# Patient Record
Sex: MALE
[De-identification: names, ages, dates, MRNs, and addresses within clinical notes are randomized per-mention and may not be internally consistent; named-entity substitution may affect disease eponyms.]

---

## 2018-11-15 ENCOUNTER — HOSPITAL ENCOUNTER (INPATIENT)
Dept: HOSPITAL 42 - ED | Age: 33
LOS: 3 days | Discharge: HOME | DRG: 603 | End: 2018-11-18
Attending: FAMILY MEDICINE | Admitting: FAMILY MEDICINE
Payer: SELF-PAY

## 2018-11-15 VITALS — BODY MASS INDEX: 43.5 KG/M2

## 2018-11-15 DIAGNOSIS — R65.10: ICD-10-CM

## 2018-11-15 DIAGNOSIS — R51: ICD-10-CM

## 2018-11-15 DIAGNOSIS — L03.115: Primary | ICD-10-CM

## 2018-11-15 DIAGNOSIS — R21: ICD-10-CM

## 2018-11-15 DIAGNOSIS — R94.31: ICD-10-CM

## 2018-11-15 DIAGNOSIS — R07.1: ICD-10-CM

## 2018-11-15 DIAGNOSIS — L53.9: ICD-10-CM

## 2018-11-15 DIAGNOSIS — J45.909: ICD-10-CM

## 2018-11-15 DIAGNOSIS — E66.01: ICD-10-CM

## 2018-11-15 LAB
ALBUMIN SERPL-MCNC: 4.3 G/DL (ref 3–4.8)
ALBUMIN/GLOB SERPL: 1.2 {RATIO} (ref 1.1–1.8)
ALT SERPL-CCNC: 32 U/L (ref 7–56)
APPEARANCE UR: (no result)
ARTERIAL BLOOD GAS HEMOGLOBIN: 14.9 G/DL (ref 11.7–17.4)
ARTERIAL BLOOD GAS O2 SAT: 99.4 % (ref 95–98)
ARTERIAL BLOOD GAS PCO2: 31 MM/HG (ref 35–45)
ARTERIAL BLOOD GAS TCO2: 26.3 MMOL.L (ref 22–28)
AST SERPL-CCNC: 24 U/L (ref 17–59)
BASOPHILS # BLD AUTO: 0 K/MM3 (ref 0–2)
BASOPHILS NFR BLD: 0 % (ref 0–3)
BILIRUB UR-MCNC: NEGATIVE MG/DL
BUN SERPL-MCNC: 12 MG/DL (ref 7–21)
CALCIUM SERPL-MCNC: 9.4 MG/DL (ref 8.4–10.5)
COLOR UR: YELLOW
EOSINOPHIL # BLD: 0 10*3/UL (ref 0–0.7)
EOSINOPHIL NFR BLD: 0 % (ref 1.5–5)
ERYTHROCYTE [DISTWIDTH] IN BLOOD BY AUTOMATED COUNT: 13 % (ref 11.5–14.5)
GFR NON-AFRICAN AMERICAN: > 60
GLUCOSE UR STRIP-MCNC: NEGATIVE MG/DL
GRANULOCYTES # BLD: 15.28 10*3/UL (ref 1.4–6.5)
GRANULOCYTES NFR BLD: 93.2 % (ref 50–68)
HCO3 BLDA-SCNC: 25.3 MMOL/L (ref 21–28)
HGB BLD-MCNC: 14.8 G/DL (ref 14–18)
INHALED O2 CONCENTRATION: 21 %
LEUKOCYTE ESTERASE UR-ACNC: NEGATIVE LEU/UL
LYMPHOCYTE: 1 % (ref 22–35)
LYMPHOCYTES # BLD: 0.6 10*3/UL (ref 1.2–3.4)
LYMPHOCYTES NFR BLD AUTO: 3.4 % (ref 22–35)
MCH RBC QN AUTO: 32.2 PG (ref 25–35)
MCHC RBC AUTO-ENTMCNC: 34.6 G/DL (ref 31–37)
MCV RBC AUTO: 93.2 FL (ref 80–105)
MONOCYTE: 4 % (ref 1–6)
MONOCYTES # BLD AUTO: 0.6 10*3/UL (ref 0.1–0.6)
MONOCYTES NFR BLD: 3.4 % (ref 1–6)
NEUTROPHILS NFR BLD AUTO: 94 % (ref 50–70)
NEUTS BAND NFR BLD: 1 % (ref 0–2)
O2 CAP BLDA-SCNC: 20.3 ML/DL (ref 16–24)
O2 CT BLDA-SCNC: 20.2 ML/DL (ref 15–23)
PH BLDA: 7.52 [PH] (ref 7.35–7.45)
PH UR STRIP: 7.5 [PH] (ref 4.7–8)
PLATELET # BLD EST: NORMAL 10*3/UL
PLATELET # BLD: 252 10^3/UL (ref 120–450)
PMV BLD AUTO: 10.3 FL (ref 7–11)
PO2 BLDA: 82 MM/HG (ref 80–100)
PROT UR STRIP-MCNC: (no result) MG/DL
RBC # BLD AUTO: 4.59 10^6/UL (ref 3.5–6.1)
RBC # UR STRIP: (no result) /UL
RBC #/AREA URNS HPF: (no result) /HPF (ref 0–2)
SP GR UR STRIP: 1.02 (ref 1–1.03)
TROPONIN I SERPL-MCNC: 0.04 NG/ML
UROBILINOGEN UR STRIP-ACNC: 0.2 E.U./DL
WBC # BLD AUTO: 16.4 10^3/UL (ref 4.5–11)
WBC #/AREA URNS HPF: NEGATIVE /HPF (ref 0–6)

## 2018-11-15 RX ADMIN — IPRATROPIUM BROMIDE AND ALBUTEROL SULFATE SCH ML: .5; 3 SOLUTION RESPIRATORY (INHALATION) at 22:44

## 2018-11-15 NOTE — CP.PCM.HP
<Jeyson Guerra - Last Filed: 11/15/18 23:29>





History of Present Illness





- History of Present Illness


History of Present Illness: 





Jeyson Guerra PGY1 History and Physical for Dr Raymond





Pt is a 34 yo male, with a PMH of asthma, who presents to the ED complaining of 

SOB, bilateral upper extremity 5th digit numbness and sweating, and bilateral 

lower extremity cramping since waking up at 10am this morning. Pt reports this 

does not feel like his normal asthma attacks and he does not know if he has ever

had a panic attack in the past. Reports anorexia, as well as nausea but denies 

vomiting. Pt states he tried his albuterol inhaler, but his symptoms were not 

relieved. These symptoms have been present all day. He reports subjective fever,

sweating, heart racing, and mild chest pain on deep breathing, he describes this

pain as a tightness. Denies cough or expectoration. Admits to feeling anxious 

but denies ever having panic attacks or being clinically diagnosed with any 

psychiatric history. Patient denies any recent travel or sick contact. Patient 

denies any drug use. A 12 point ROS was obtained and added to the HPI where 

appropriate. 





PMH: asthma, never intubated, uses inhaler 1-5xper day, 1-2 times per night


PSH: denies


SH: Tobacco never, Alcohol 3-4 drinks on the weekends, admits to marijuana in 

the past 20 years ago


FH: Mother- asthma. Father 60 leukemia. 


Allergies: denies


Home meds: albuterol 


PMD: denies








Present on Admission





- Present on Admission


Any Indicators Present on Admission: No





Review of Systems





- Review of Systems


Review of Systems: 





a 12 point ROS was obtained and added to the HPI where appropriate 





Past Patient History





- Past Social History


Smoking Status: Never Smoked





- CARDIAC


Hx Cardiac Disorders: No





- PULMONARY


Hx Asthma: Yes





- NEUROLOGICAL


Hx Neurological Disorder: No





- HEENT


Hx HEENT Problems: No





- RENAL


Hx Chronic Kidney Disease: No





- ENDOCRINE/METABOLIC


Hx Endocrine Disorders: No





- HEMATOLOGICAL/ONCOLOGICAL


Hx Blood Disorders: No





- INTEGUMENTARY


Hx Dermatological Problems: No





- MUSCULOSKELETAL/RHEUMATOLOGICAL


Hx Musculoskeletal Disorders: No





- GASTROINTESTINAL


Hx Gastrointestinal Disorders: No





- GENITOURINARY/GYNECOLOGICAL


Hx Genitourinary Disorders: No





- PSYCHIATRIC


Hx Psychophysiologic Disorder: No


Hx Substance Use: No





- SURGICAL HISTORY


Hx Surgeries: No





Meds


Allergies/Adverse Reactions: 


                                    Allergies











Allergy/AdvReac Type Severity Reaction Status Date / Time


 


No Known Allergies Allergy   Verified 11/15/18 16:52














Physical Exam





- Constitutional


Appears: Non-toxic, No Acute Distress





- Head Exam


Head Exam: ATRAUMATIC, NORMAL INSPECTION, NORMOCEPHALIC





- Eye Exam


Eye Exam: EOMI





- ENT Exam


ENT Exam: Mucous Membranes Moist





- Neck Exam


Neck exam: Positive for: Full Rom, Normal Inspection





- Respiratory Exam


Respiratory Exam: NORMAL BREATHING PATTERN.  absent: Accessory Muscle Use, 

Wheezes, Respiratory Distress





- Cardiovascular Exam


Cardiovascular Exam: RRR, +S1, +S2.  absent: Diastolic murmur, Systolic Murmur





- GI/Abdominal Exam


GI & Abdominal Exam: Normal Bowel Sounds, Soft.  absent: Tenderness





- Extremities Exam


Extremities exam: Positive for: full ROM, normal inspection, pedal pulses 

present.  Negative for: calf tenderness, pedal edema





- Neurological Exam


Neurological exam: Alert, Oriented x3





- Psychiatric Exam


Psychiatric exam: Normal Affect, Normal Mood





- Skin


Skin Exam: Dry, Intact, Warm





Results





- Vital Signs


Recent Vital Signs: 





                                Last Vital Signs











Temp  98.6 F   11/15/18 17:06


 


Pulse  116 H  11/15/18 17:06


 


Resp  17   11/15/18 17:06


 


BP  144/89   11/15/18 17:06


 


Pulse Ox  98   11/15/18 17:06














- Labs


Result Diagrams: 


                                 11/15/18 17:52





                                 11/15/18 18:51


Labs: 





                         Laboratory Results - last 24 hr











  11/15/18 11/15/18 11/15/18





  17:00 17:52 18:13


 


WBC   16.4 H 


 


RBC   4.59 


 


Hgb   14.8 


 


Hct   42.8 


 


MCV   93.2 


 


MCH   32.2 


 


MCHC   34.6 


 


RDW   13.0 


 


Plt Count   252 


 


MPV   10.3 


 


Gran %   93.2 H 


 


Lymph % (Auto)   3.4 L 


 


Mono % (Auto)   3.4 


 


Eos % (Auto)   0.0 L 


 


Baso % (Auto)   0.0 


 


Gran #   15.28 H 


 


Lymph # (Auto)   0.6 L 


 


Mono # (Auto)   0.6 


 


Eos # (Auto)   0.0 


 


Baso # (Auto)   0.00 


 


Neutrophils % (Manual)   94 H 


 


Band Neutrophils %   1 


 


Lymphocytes % (Manual)   1 L 


 


Monocytes % (Manual)   4 


 


Platelet Evaluation   Normal 


 


D-Dimer, Quantitative   


 


pCO2  31 L  


 


pO2  82.0  


 


HCO3  25.3  


 


ABG pH  7.52 H  


 


ABG Total CO2  26.3  


 


ABG O2 Saturation  99.4 H  


 


ABG O2 Content  20.2  


 


ABG Base Excess  3.2 H  


 


ABG Hemoglobin  14.9  


 


ABG Carboxyhemoglobin  2.2 H  


 


POC ABG HHb (Measured)  0.6  


 


ABG Methemoglobin  1.1  


 


ABG O2 Capacity  20.3  


 


Hgb O2 Saturation  96.1  


 


FiO2  21.0  


 


Sodium   


 


Potassium   


 


Chloride   


 


Carbon Dioxide   


 


Anion Gap   


 


BUN   


 


Creatinine   


 


Est GFR ( Amer)   


 


Est GFR (Non-Af Amer)   


 


Random Glucose   


 


Calcium   


 


Phosphorus   


 


Magnesium   


 


Total Bilirubin   


 


AST   


 


ALT   


 


Alkaline Phosphatase   


 


Total Creatine Kinase   


 


Troponin I   


 


Total Protein   


 


Albumin   


 


Globulin   


 


Albumin/Globulin Ratio   


 


TSH 3rd Generation   


 


Urine Color    Yellow


 


Urine Appearance    Sl cloudy


 


Urine pH    7.5


 


Ur Specific Gravity    1.020


 


Urine Protein    Trace H


 


Urine Glucose (UA)    Negative


 


Urine Ketones    Negative


 


Urine Blood    Trace-intact H


 


Urine Nitrate    Negative


 


Urine Bilirubin    Negative


 


Urine Urobilinogen    0.2


 


Ur Leukocyte Esterase    Negative


 


Urine RBC    0 - 2


 


Urine WBC    Negative


 


Urine Opiates Screen   


 


Urine Methadone Screen   


 


Ur Barbiturates Screen   


 


Ur Phencyclidine Scrn   


 


Ur Amphetamines Screen   


 


U Benzodiazepines Scrn   


 


U Oth Cocaine Metabols   


 


U Cannabinoids Screen   














  11/15/18 11/15/18 11/15/18





  18:13 18:51 19:15


 


WBC   


 


RBC   


 


Hgb   


 


Hct   


 


MCV   


 


MCH   


 


MCHC   


 


RDW   


 


Plt Count   


 


MPV   


 


Gran %   


 


Lymph % (Auto)   


 


Mono % (Auto)   


 


Eos % (Auto)   


 


Baso % (Auto)   


 


Gran #   


 


Lymph # (Auto)   


 


Mono # (Auto)   


 


Eos # (Auto)   


 


Baso # (Auto)   


 


Neutrophils % (Manual)   


 


Band Neutrophils %   


 


Lymphocytes % (Manual)   


 


Monocytes % (Manual)   


 


Platelet Evaluation   


 


D-Dimer, Quantitative   


 


pCO2   


 


pO2   


 


HCO3   


 


ABG pH   


 


ABG Total CO2   


 


ABG O2 Saturation   


 


ABG O2 Content   


 


ABG Base Excess   


 


ABG Hemoglobin   


 


ABG Carboxyhemoglobin   


 


POC ABG HHb (Measured)   


 


ABG Methemoglobin   


 


ABG O2 Capacity   


 


Hgb O2 Saturation   


 


FiO2   


 


Sodium   137 


 


Potassium   4.3 


 


Chloride   97 L 


 


Carbon Dioxide   31 


 


Anion Gap   13 


 


BUN   12 


 


Creatinine   1.3 


 


Est GFR ( Amer)   > 60 


 


Est GFR (Non-Af Amer)   > 60 


 


Random Glucose   116 H 


 


Calcium   9.4 


 


Phosphorus   3.5 


 


Magnesium   1.5 L 


 


Total Bilirubin   1.1 


 


AST   24 


 


ALT   32 


 


Alkaline Phosphatase   64 


 


Total Creatine Kinase   63 


 


Troponin I   0.04 


 


Total Protein   8.0 


 


Albumin   4.3 


 


Globulin   3.7 


 


Albumin/Globulin Ratio   1.2 


 


TSH 3rd Generation    0.51


 


Urine Color   


 


Urine Appearance   


 


Urine pH   


 


Ur Specific Gravity   


 


Urine Protein   


 


Urine Glucose (UA)   


 


Urine Ketones   


 


Urine Blood   


 


Urine Nitrate   


 


Urine Bilirubin   


 


Urine Urobilinogen   


 


Ur Leukocyte Esterase   


 


Urine RBC   


 


Urine WBC   


 


Urine Opiates Screen  Negative  


 


Urine Methadone Screen  Negative  


 


Ur Barbiturates Screen  Negative  


 


Ur Phencyclidine Scrn  Negative  


 


Ur Amphetamines Screen  Negative  


 


U Benzodiazepines Scrn  Negative  


 


U Oth Cocaine Metabols  Negative  


 


U Cannabinoids Screen  Negative  














  11/15/18





  19:29


 


WBC 


 


RBC 


 


Hgb 


 


Hct 


 


MCV 


 


MCH 


 


MCHC 


 


RDW 


 


Plt Count 


 


MPV 


 


Gran % 


 


Lymph % (Auto) 


 


Mono % (Auto) 


 


Eos % (Auto) 


 


Baso % (Auto) 


 


Gran # 


 


Lymph # (Auto) 


 


Mono # (Auto) 


 


Eos # (Auto) 


 


Baso # (Auto) 


 


Neutrophils % (Manual) 


 


Band Neutrophils % 


 


Lymphocytes % (Manual) 


 


Monocytes % (Manual) 


 


Platelet Evaluation 


 


D-Dimer, Quantitative  < 200


 


pCO2 


 


pO2 


 


HCO3 


 


ABG pH 


 


ABG Total CO2 


 


ABG O2 Saturation 


 


ABG O2 Content 


 


ABG Base Excess 


 


ABG Hemoglobin 


 


ABG Carboxyhemoglobin 


 


POC ABG HHb (Measured) 


 


ABG Methemoglobin 


 


ABG O2 Capacity 


 


Hgb O2 Saturation 


 


FiO2 


 


Sodium 


 


Potassium 


 


Chloride 


 


Carbon Dioxide 


 


Anion Gap 


 


BUN 


 


Creatinine 


 


Est GFR ( Amer) 


 


Est GFR (Non-Af Amer) 


 


Random Glucose 


 


Calcium 


 


Phosphorus 


 


Magnesium 


 


Total Bilirubin 


 


AST 


 


ALT 


 


Alkaline Phosphatase 


 


Total Creatine Kinase 


 


Troponin I 


 


Total Protein 


 


Albumin 


 


Globulin 


 


Albumin/Globulin Ratio 


 


TSH 3rd Generation 


 


Urine Color 


 


Urine Appearance 


 


Urine pH 


 


Ur Specific Gravity 


 


Urine Protein 


 


Urine Glucose (UA) 


 


Urine Ketones 


 


Urine Blood 


 


Urine Nitrate 


 


Urine Bilirubin 


 


Urine Urobilinogen 


 


Ur Leukocyte Esterase 


 


Urine RBC 


 


Urine WBC 


 


Urine Opiates Screen 


 


Urine Methadone Screen 


 


Ur Barbiturates Screen 


 


Ur Phencyclidine Scrn 


 


Ur Amphetamines Screen 


 


U Benzodiazepines Scrn 


 


U Oth Cocaine Metabols 


 


U Cannabinoids Screen 














Assessment & Plan





- Assessment and Plan (Free Text)


Assessment: 





Pt is a 34 yo male, with a PMH of asthma, who presents to the ED complaining of 

SOB, bilateral upper extremity 5th digit numbness and sweating. Numbness could 

be secondary to hyperventilation which is inferred from ABGs. Bilateral lower 

extremity cramping which could be a symptom of low mg. Pt reports this SOB does 

not feel like his normal asthma attacks and he does not know if he has ever had 

a panic attack in the past. Pt has chest pain (unspecified), asthma, low mg, and

leukocytosis. 


Plan: 





Chest pain, mild SOB


- ACS rule out


- likely due to anxiety or asthma


- HA1C


- Lipid panel in AM 


- Trend troponin


- ASA given in ED


- O2 2L


- Trend EKG


- ECHO


- cardio consulted, Dr Coe





Leukocytosis


- WBC 16.4


- VBG shock panel 


- blood cultures


- urine cultures


- rectal temp


- procal


- rocephin 





HypoMg


- replete PRN





Asthma


- duonebs PRN/ALEJANDRA





Ppx


- heparin


- protonix








Pt seen, examined, assessment and plan discussed with Dr Segundo Guerra PGY1, Internal Medicine Resident 





- Date & Time


Date: 11/15/18


Time: 21:57





<Monique Raymond - Last Filed: 11/15/18 23:58>





Results





- Vital Signs


Recent Vital Signs: 





                                Last Vital Signs











Temp  98.6 F   11/15/18 17:06


 


Pulse  101 H  11/15/18 23:19


 


Resp  18   11/15/18 23:19


 


BP  137/84   11/15/18 23:19


 


Pulse Ox  100   11/15/18 23:19














- Labs


Result Diagrams: 


                                 11/15/18 17:52





                                 11/15/18 18:51


Labs: 





                         Laboratory Results - last 24 hr











  11/15/18 11/15/18 11/15/18





  17:00 17:52 18:13


 


WBC   16.4 H 


 


RBC   4.59 


 


Hgb   14.8 


 


Hct   42.8 


 


MCV   93.2 


 


MCH   32.2 


 


MCHC   34.6 


 


RDW   13.0 


 


Plt Count   252 


 


MPV   10.3 


 


Gran %   93.2 H 


 


Lymph % (Auto)   3.4 L 


 


Mono % (Auto)   3.4 


 


Eos % (Auto)   0.0 L 


 


Baso % (Auto)   0.0 


 


Gran #   15.28 H 


 


Lymph # (Auto)   0.6 L 


 


Mono # (Auto)   0.6 


 


Eos # (Auto)   0.0 


 


Baso # (Auto)   0.00 


 


Neutrophils % (Manual)   94 H 


 


Band Neutrophils %   1 


 


Lymphocytes % (Manual)   1 L 


 


Monocytes % (Manual)   4 


 


Platelet Evaluation   Normal 


 


D-Dimer, Quantitative   


 


pCO2  31 L  


 


pO2  82.0  


 


HCO3  25.3  


 


ABG pH  7.52 H  


 


ABG Total CO2  26.3  


 


ABG O2 Saturation  99.4 H  


 


ABG O2 Content  20.2  


 


ABG Base Excess  3.2 H  


 


ABG Hemoglobin  14.9  


 


ABG Carboxyhemoglobin  2.2 H  


 


POC ABG HHb (Measured)  0.6  


 


ABG Methemoglobin  1.1  


 


ABG O2 Capacity  20.3  


 


Hgb O2 Saturation  96.1  


 


FiO2  21.0  


 


Sodium   


 


Potassium   


 


Chloride   


 


Carbon Dioxide   


 


Anion Gap   


 


BUN   


 


Creatinine   


 


Est GFR ( Amer)   


 


Est GFR (Non-Af Amer)   


 


Random Glucose   


 


Calcium   


 


Phosphorus   


 


Magnesium   


 


Total Bilirubin   


 


AST   


 


ALT   


 


Alkaline Phosphatase   


 


Total Creatine Kinase   


 


Troponin I   


 


Total Protein   


 


Albumin   


 


Globulin   


 


Albumin/Globulin Ratio   


 


TSH 3rd Generation   


 


Urine Color    Yellow


 


Urine Appearance    Sl cloudy


 


Urine pH    7.5


 


Ur Specific Gravity    1.020


 


Urine Protein    Trace H


 


Urine Glucose (UA)    Negative


 


Urine Ketones    Negative


 


Urine Blood    Trace-intact H


 


Urine Nitrate    Negative


 


Urine Bilirubin    Negative


 


Urine Urobilinogen    0.2


 


Ur Leukocyte Esterase    Negative


 


Urine RBC    0 - 2


 


Urine WBC    Negative


 


Urine Opiates Screen   


 


Urine Methadone Screen   


 


Ur Barbiturates Screen   


 


Ur Phencyclidine Scrn   


 


Ur Amphetamines Screen   


 


U Benzodiazepines Scrn   


 


U Oth Cocaine Metabols   


 


U Cannabinoids Screen   














  11/15/18 11/15/18 11/15/18





  18:13 18:51 19:15


 


WBC   


 


RBC   


 


Hgb   


 


Hct   


 


MCV   


 


MCH   


 


MCHC   


 


RDW   


 


Plt Count   


 


MPV   


 


Gran %   


 


Lymph % (Auto)   


 


Mono % (Auto)   


 


Eos % (Auto)   


 


Baso % (Auto)   


 


Gran #   


 


Lymph # (Auto)   


 


Mono # (Auto)   


 


Eos # (Auto)   


 


Baso # (Auto)   


 


Neutrophils % (Manual)   


 


Band Neutrophils %   


 


Lymphocytes % (Manual)   


 


Monocytes % (Manual)   


 


Platelet Evaluation   


 


D-Dimer, Quantitative   


 


pCO2   


 


pO2   


 


HCO3   


 


ABG pH   


 


ABG Total CO2   


 


ABG O2 Saturation   


 


ABG O2 Content   


 


ABG Base Excess   


 


ABG Hemoglobin   


 


ABG Carboxyhemoglobin   


 


POC ABG HHb (Measured)   


 


ABG Methemoglobin   


 


ABG O2 Capacity   


 


Hgb O2 Saturation   


 


FiO2   


 


Sodium   137 


 


Potassium   4.3 


 


Chloride   97 L 


 


Carbon Dioxide   31 


 


Anion Gap   13 


 


BUN   12 


 


Creatinine   1.3 


 


Est GFR ( Amer)   > 60 


 


Est GFR (Non-Af Amer)   > 60 


 


Random Glucose   116 H 


 


Calcium   9.4 


 


Phosphorus   3.5 


 


Magnesium   1.5 L 


 


Total Bilirubin   1.1 


 


AST   24 


 


ALT   32 


 


Alkaline Phosphatase   64 


 


Total Creatine Kinase   63 


 


Troponin I   0.04 


 


Total Protein   8.0 


 


Albumin   4.3 


 


Globulin   3.7 


 


Albumin/Globulin Ratio   1.2 


 


TSH 3rd Generation    0.51


 


Urine Color   


 


Urine Appearance   


 


Urine pH   


 


Ur Specific Gravity   


 


Urine Protein   


 


Urine Glucose (UA)   


 


Urine Ketones   


 


Urine Blood   


 


Urine Nitrate   


 


Urine Bilirubin   


 


Urine Urobilinogen   


 


Ur Leukocyte Esterase   


 


Urine RBC   


 


Urine WBC   


 


Urine Opiates Screen  Negative  


 


Urine Methadone Screen  Negative  


 


Ur Barbiturates Screen  Negative  


 


Ur Phencyclidine Scrn  Negative  


 


Ur Amphetamines Screen  Negative  


 


U Benzodiazepines Scrn  Negative  


 


U Oth Cocaine Metabols  Negative  


 


U Cannabinoids Screen  Negative  














  11/15/18





  19:29


 


WBC 


 


RBC 


 


Hgb 


 


Hct 


 


MCV 


 


MCH 


 


MCHC 


 


RDW 


 


Plt Count 


 


MPV 


 


Gran % 


 


Lymph % (Auto) 


 


Mono % (Auto) 


 


Eos % (Auto) 


 


Baso % (Auto) 


 


Gran # 


 


Lymph # (Auto) 


 


Mono # (Auto) 


 


Eos # (Auto) 


 


Baso # (Auto) 


 


Neutrophils % (Manual) 


 


Band Neutrophils % 


 


Lymphocytes % (Manual) 


 


Monocytes % (Manual) 


 


Platelet Evaluation 


 


D-Dimer, Quantitative  < 200


 


pCO2 


 


pO2 


 


HCO3 


 


ABG pH 


 


ABG Total CO2 


 


ABG O2 Saturation 


 


ABG O2 Content 


 


ABG Base Excess 


 


ABG Hemoglobin 


 


ABG Carboxyhemoglobin 


 


POC ABG HHb (Measured) 


 


ABG Methemoglobin 


 


ABG O2 Capacity 


 


Hgb O2 Saturation 


 


FiO2 


 


Sodium 


 


Potassium 


 


Chloride 


 


Carbon Dioxide 


 


Anion Gap 


 


BUN 


 


Creatinine 


 


Est GFR ( Amer) 


 


Est GFR (Non-Af Amer) 


 


Random Glucose 


 


Calcium 


 


Phosphorus 


 


Magnesium 


 


Total Bilirubin 


 


AST 


 


ALT 


 


Alkaline Phosphatase 


 


Total Creatine Kinase 


 


Troponin I 


 


Total Protein 


 


Albumin 


 


Globulin 


 


Albumin/Globulin Ratio 


 


TSH 3rd Generation 


 


Urine Color 


 


Urine Appearance 


 


Urine pH 


 


Ur Specific Gravity 


 


Urine Protein 


 


Urine Glucose (UA) 


 


Urine Ketones 


 


Urine Blood 


 


Urine Nitrate 


 


Urine Bilirubin 


 


Urine Urobilinogen 


 


Ur Leukocyte Esterase 


 


Urine RBC 


 


Urine WBC 


 


Urine Opiates Screen 


 


Urine Methadone Screen 


 


Ur Barbiturates Screen 


 


Ur Phencyclidine Scrn 


 


Ur Amphetamines Screen 


 


U Benzodiazepines Scrn 


 


U Oth Cocaine Metabols 


 


U Cannabinoids Screen 














Attending/Attestation





- Attestation


I have personally seen and examined this patient.: Yes


I have fully participated in the care of the patient.: Yes


I have reviewed all pertinent clinical information: Yes


Notes (Text): 





11/15/18 23:48


Pt seen with the resident by the bedside.


Case discussed in detail.


Agree with examination findings,documentation,assessment and plan of treatment.





Note: Since pt stated he had a fever before he came here,has leucocytosis, 

rectal temp was ordered.


He refused rectal temp. Repeat oral temp was reported 100.4


Septic work up was ordered. 


Rocephin was ordered empirically.

## 2018-11-15 NOTE — ED PDOC
Arrival/HPI





- General


Chief Complaint: Medical Clearance


Time Seen by Provider: 11/15/18 16:47


Historian: Patient





- History of Present Illness


Narrative History of Present Illness (Text): 





11/15/18 17:06





33 year old male, with past medical history of asthma, presents to the ED 

complaining of SOB, bilateral upper extremity 5th digit numbness and sweatiness,

and bilateral lower extremity cramping since waking up this morning. Patient 

informs feeling anxious but denies ever having panic attacks or being clinically

diagnosed with any psychiatric history. Patient denies any other associated 

somatic complaints. Patient denies any fever, chills, chest pain, cough, leg 

swelling, neck pain, back pain or any other complaints. Patient denies any 

recent trauma/inujury or any recent procedure. Patient denies any recent travel 

or sick contact. Patient denies any drug use. 





Time/Duration: 4-6 hours


Symptom Onset: Gradual


Symptom Course: Unchanged


Activities at Onset: Light


Context: Home





Past Medical History





- Provider Review


Nursing Documentation Reviewed: Yes





- Cardiac


Hx Cardiac Disorders: No





- Pulmonary


Hx Asthma: Yes





- Neurological


Hx Neurological Disorder: No





- HEENT


Hx HEENT Disorder: No





- Renal


Hx Renal Disorder: No





- Endocrine/Metabolic


Hx Endocrine Disorders: No





- Hematological/Oncological


Hx Blood Disorders: No





- Integumentary


Hx Dermatological Disorder: No





- Musculoskeletal/Rheumatological


Hx Musculoskeletal Disorders: No





- Gastrointestinal


Hx Gastrointestinal Disorders: No





- Genitourinary/Gynecological


Hx Genitourinary Disorders: No





- Psychiatric


Hx Psychophysiologic Disorder: No


Hx Substance Use: No





Family/Social History





- Physician Review


Nursing Documentation Reviewed: Yes


Family/Social History: Unknown Family HX


Smoking Status: Never Smoked


Hx Alcohol Use: No


Hx Substance Use: No





Allergies/Home Meds


Allergies/Adverse Reactions: 


Allergies





No Known Allergies Allergy (Verified 11/15/18 16:52)


   








Home Medications: 


                                    Home Meds











 Medication  Instructions  Recorded  Confirmed


 


Albuterol/Ipratropium [Duoneb 3 1 ea IH DAILY 11/15/18 11/15/18





MG/3 Ml-0.5 MG/3 Ml 3 Ml]   














Review of Systems





- Physician Review


All systems were reviewed & negative as marked: Yes





- Review of Systems


Respiratory: SOB.  absent: Cough


Cardiovascular: absent: Chest Pain, Edema


Musculoskeletal: absent: Neck Pain





Physical Exam





- Physical Exam


Narrative Physical Exam (Text): 





11/15/18 17:06


Constitutional: Anxious appearing.


Head: Normocephalic.  Atraumatic.  


Eyes:  PERRL.


ENT:  Moist mucous membranes.


Neck:  Supple.


Cardiovascular:  Regular rate.


Chest: No tenderness.


Respiratory:  Clear to auscultation bilaterally. 


GI:  Soft. Nontender. Nondistended. 


Back:  No CVA tenderness.


Musculoskeletal:  No asymmetrical edema.


Skin:  No rash. 


Neurologic:  Alert, no focal deficit.





Vital Signs Reviewed: Yes





Vital Signs











  Temp Pulse Resp BP Pulse Ox


 


 11/15/18 17:06  98.6 F  116 H  17  144/89  98











Temperature: Afebrile


Blood Pressure: Normal


Pulse: Tachycardic


Respiratory Rate: Normal


Appearance: Positive for: Non-Toxic, Comfortable, Other (Anxious-appearing)


Pain Distress: None


Mental Status: Positive for: Alert and Oriented X 3





Medical Decision Making


ED Course and Treatment: 





11/15/18 17:06


Impression: 33 year old male presents to the ED complaining of SOB.





Plan: 


-- ABG


-- EKG


-- Labs


-- CXR


-- UA 





Progress Notes: 





11/15/18 17:06


EKG reviewed, shows sinus tachycardia at 104 bpm, no ST elevation, normal axis, 

T-wave inversions laterally. 


CXR reviewed, shows no acute disease. 





11/15/18 20:17


Troponin detectable at 0.04. D dimer negative. WBC elevated.





Dr. Raymond accepts patient to hospitalist service. ASA administered. 





- Lab Interpretations


Lab Results: 











                                 11/15/18 17:52 





                                   Lab Results





11/15/18 17:52: WBC 16.4 H, RBC 4.59, Hgb 14.8, Hct 42.8, MCV 93.2, MCH 32.2, 

MCHC 34.6, RDW 13.0, Plt Count 252, MPV 10.3, Gran % 93.2 H, Lymph % (Auto) 3.4 

L, Mono % (Auto) 3.4, Eos % (Auto) 0.0 L, Baso % (Auto) 0.0, Gran # 15.28 H, 

Lymph # (Auto) 0.6 L, Mono # (Auto) 0.6, Eos # (Auto) 0.0, Baso # (Auto) 0.00, 

Neutrophils % (Manual) Pending, Lymphocytes % (Manual) Pending, Monocytes % 

(Manual) Pending


11/15/18 17:00: pCO2 31 L, pO2 82.0, HCO3 25.3, ABG pH 7.52 H, ABG Total CO2 

26.3, ABG O2 Saturation 99.4 H, ABG O2 Content 20.2, ABG Base Excess 3.2 H, ABG 

Hemoglobin 14.9, ABG Carboxyhemoglobin 2.2 H, POC ABG HHb (Measured) 0.6, ABG Me

themoglobin 1.1, ABG O2 Capacity 20.3, Hgb O2 Saturation 96.1, FiO2 21.0











- RAD Interpretation


Radiology Orders: 











11/15/18 17:06


CHEST PORTABLE [RAD] Stat 











: ED Physician





- EKG Interpretation


Interpreted by ED Physician: Yes


Type: 12 lead EKG





- Scribe Statement


The provider has reviewed the documentation as recorded by the Scribe


Jeanette Omer.





All medical record entries made by the Scribe were at my direction and personall

y dictated by me. I have reviewed the chart and agree that the record accurately

 reflects my personal performance of the history, physical exam, medical 

decision making, and the department course for this patient. I have also 

personally directed, reviewed, and agree with the discharge instructions and 

disposition.





Disposition/Present on Arrival





- Present on Arrival


Any Indicators Present on Arrival: No


History of DVT/PE: No


History of Uncontrolled Diabetes: No


Urinary Catheter: No


History of Decub. Ulcer: No


History Surgical Site Infection Following: None





- Disposition


Have Diagnosis and Disposition been Completed?: Yes


Diagnosis: 


 Dyspnea, Acute electrocardiogram changes





Disposition: HOSPITALIZED


Disposition Time: 20:18


Patient Plan: Observation, Telemetry


Condition: STABLE


Forms:  [employer] (English)

## 2018-11-15 NOTE — CARD
--------------- APPROVED REPORT --------------





Date of service: 11/15/2018



EKG Measurement

Heart [id number]

SD 156P22

[id number]

[id number]

[id number]



<Conclusion>

Sinus tachycardia

T wave abnormality, consider inferolateral ischemia

Abnormal ECG

## 2018-11-15 NOTE — RAD
Date of service: 



11/15/2018



HISTORY:

 dyspnea 



COMPARISON:

No prior. 



FINDINGS:



LUNGS:

No active pulmonary disease.



PLEURA:

No significant pleural effusion identified, no pneumothorax apparent.



CARDIOVASCULAR:

No atherosclerotic calcification present



Normal.



OSSEOUS STRUCTURES:

No significant abnormalities.



VISUALIZED UPPER ABDOMEN:

Normal.



OTHER FINDINGS:

None.



IMPRESSION:

No active disease.

## 2018-11-16 LAB
ALBUMIN SERPL-MCNC: 3.9 G/DL (ref 3–4.8)
ALBUMIN/GLOB SERPL: 1.1 {RATIO} (ref 1.1–1.8)
ALT SERPL-CCNC: 32 U/L (ref 7–56)
AST SERPL-CCNC: 25 U/L (ref 17–59)
BASE EXCESS BLDV CALC-SCNC: 8.5 MMOL/L (ref 0–2)
BASOPHILS # BLD AUTO: 0 K/MM3 (ref 0–2)
BASOPHILS NFR BLD: 0 % (ref 0–3)
BUN SERPL-MCNC: 15 MG/DL (ref 7–21)
CALCIUM SERPL-MCNC: 9 MG/DL (ref 8.4–10.5)
EOSINOPHIL # BLD: 0 10*3/UL (ref 0–0.7)
EOSINOPHIL NFR BLD: 0 % (ref 1.5–5)
ERYTHROCYTE [DISTWIDTH] IN BLOOD BY AUTOMATED COUNT: 13.1 % (ref 11.5–14.5)
FOLATE SERPL-MCNC: 7.8 NG/ML
GFR NON-AFRICAN AMERICAN: > 60
GRANULOCYTES # BLD: 8.67 10*3/UL (ref 1.4–6.5)
GRANULOCYTES NFR BLD: 85.8 % (ref 50–68)
HDLC SERPL-MCNC: 42 MG/DL (ref 29–60)
HGB BLD-MCNC: 13.9 G/DL (ref 14–18)
LDLC SERPL-MCNC: 114 MG/DL (ref 0–129)
LYMPHOCYTES # BLD: 0.9 10*3/UL (ref 1.2–3.4)
LYMPHOCYTES NFR BLD AUTO: 8.4 % (ref 22–35)
MCH RBC QN AUTO: 32.2 PG (ref 25–35)
MCHC RBC AUTO-ENTMCNC: 34.3 G/DL (ref 31–37)
MCV RBC AUTO: 93.8 FL (ref 80–105)
MONOCYTES # BLD AUTO: 0.6 10*3/UL (ref 0.1–0.6)
MONOCYTES NFR BLD: 5.8 % (ref 1–6)
PH BLDV: 7.43 [PH] (ref 7.32–7.43)
PLATELET # BLD: 205 10^3/UL (ref 120–450)
PMV BLD AUTO: 9.8 FL (ref 7–11)
RBC # BLD AUTO: 4.32 10^6/UL (ref 3.5–6.1)
TROPONIN I SERPL-MCNC: 0.03 NG/ML
VENOUS BLOOD FIO2: 21 %
VENOUS BLOOD GAS PCO2: 52 (ref 40–60)
VENOUS BLOOD GAS PO2: 19 MM/HG (ref 30–55)
VIT B12 SERPL-MCNC: 295 PG/ML (ref 239–931)
WBC # BLD AUTO: 10.1 10^3/UL (ref 4.5–11)

## 2018-11-16 RX ADMIN — IPRATROPIUM BROMIDE AND ALBUTEROL SULFATE SCH ML: .5; 3 SOLUTION RESPIRATORY (INHALATION) at 04:53

## 2018-11-16 RX ADMIN — IPRATROPIUM BROMIDE AND ALBUTEROL SULFATE SCH ML: .5; 3 SOLUTION RESPIRATORY (INHALATION) at 07:28

## 2018-11-16 RX ADMIN — CEFTRIAXONE SCH MLS/HR: 1 INJECTION, SOLUTION INTRAVENOUS at 09:31

## 2018-11-16 RX ADMIN — CEFTRIAXONE SCH MLS/HR: 1 INJECTION, SOLUTION INTRAVENOUS at 00:24

## 2018-11-16 RX ADMIN — PANTOPRAZOLE SODIUM SCH MG: 40 TABLET, DELAYED RELEASE ORAL at 09:31

## 2018-11-16 RX ADMIN — FLUTICASONE PROPIONATE SCH APPLIC: 50 SPRAY, METERED NASAL at 13:35

## 2018-11-16 RX ADMIN — IPRATROPIUM BROMIDE AND ALBUTEROL SULFATE SCH ML: .5; 3 SOLUTION RESPIRATORY (INHALATION) at 01:09

## 2018-11-16 NOTE — CARD
--------------- APPROVED REPORT --------------





Date of service: 11/16/2018



EKG Measurement

Heart [id number]

MD 160P30

[id number]

[id number]

[id number]



<Conclusion>

Normal sinus rhythm

Minimal voltage criteria for LVH, may be normal variant

T wave abnormality, consider inferolateral ischemia

Abnormal ECG

## 2018-11-16 NOTE — CP.PCM.PN
<Velma Bell - Last Filed: 11/16/18 16:56>





Subjective





- Date & Time of Evaluation


Date of Evaluation: 11/16/18


Time of Evaluation: 10:00





- Subjective


Subjective: 





Velma Bell, PGY2, Medicine Progress Note for Dr Baumann:





Patient seen and examined at bedside. Overnight, patient had a temp of 100.4F, 

with chills. This AM, patient states that he has a headache, relieved partially 

with tylenol. Patient reports right frontal headache and stuffy nose. States 

that she chest tightness, sob has resolved. Also states that his lower thigh 

cramping and upper extremity 5th digit tingling has resolved this AM. States 

that his Denies cough, sputum, runny nose. Patient states that she suffers from 

sinus pressure, but currently denies it. Denies urinary symptoms, chest pain, 

sob, neck pain, diaphoresis, nausea, vomiting, diarrhea, leg swelling. 





Objective





- Vital Signs/Intake and Output


Vital Signs (last 24 hours): 


                                        











Temp Pulse Resp BP Pulse Ox


 


 98 F   92 H  20   143/91 H  97 


 


 11/16/18 12:00  11/16/18 16:00  11/16/18 12:00  11/16/18 12:00  11/16/18 09:55








Intake and Output: 


                                        











 11/16/18 11/16/18





 06:59 18:59


 


Intake Total 640 


 


Balance 640 














- Medications


Medications: 


                               Current Medications





Acetaminophen (Tylenol 325mg Tab)  650 mg PO Q6H PRN


   PRN Reason: Headache


   Last Admin: 11/16/18 09:33 Dose:  650 mg


Aspirin (Ecotrin)  81 mg PO DAILY [facility]


   Last Admin: 11/16/18 09:31 Dose:  81 mg


Fluticasone Propionate (Flonase)  1 actuation NS DAILY ALEJANDRA


   Last Admin: 11/16/18 13:35 Dose:  1 applic


Heparin Sodium (Porcine) (Heparin)  5,000 units SC Q12 ALEJANDRA; Protocol


   Last Admin: 11/16/18 09:31 Dose:  5,000 units


Ceftriaxone Sodium (Rocephin 1 Gram Ivpb)  1 gm in 100 mls @ 100 mls/hr IVPB 

DAILY ALEJANDRA; Protocol


   Last Admin: 11/16/18 09:31 Dose:  100 mls/hr


Pantoprazole Sodium (Protonix Ec Tab)  40 mg PO 0600 [facility]


   Last Admin: 11/16/18 09:31 Dose:  40 mg











- Labs


Labs: 


                                        





                                 11/16/18 06:20 





                                 11/16/18 06:20 











- Constitutional


Appears: Non-toxic, No Acute Distress





- Head Exam


Head Exam: ATRAUMATIC, NORMOCEPHALIC





- Eye Exam


Eye Exam: EOMI, PERRL.  absent: Conjunctival injection, Nystagmus, Scleral 

icterus


Pupil Exam: NORMAL ACCOMODATION, PERRL.  absent: Fixed, Irregular, Miosis, 

Unequal





- ENT Exam


ENT Exam: Mucous Membranes Dry


Additional comments: 





no sinus pressure noted





- Neck Exam


Neck Exam: Full ROM





- Respiratory Exam


Respiratory Exam: Clear to Ausculation Bilateral, NORMAL BREATHING PATTERN.  

absent: Accessory Muscle Use, Rhonchi, Wheezes, Respiratory Distress, Stridor





- Cardiovascular Exam


Cardiovascular Exam: RRR, +S1, +S2.  absent: Murmur





- GI/Abdominal Exam


GI & Abdominal Exam: Soft, Normal Bowel Sounds.  absent: Distended, Guarding, 

Rigid, Tenderness, Mass, Organomegaly, Rebound





- Extremities Exam


Extremities Exam: Full ROM, Normal Inspection.  absent: Calf Tenderness, Pedal 

Edema





- Back Exam


Back Exam: NORMAL INSPECTION.  absent: CVA tenderness (L), CVA tenderness (R)





- Neurological Exam


Neurological Exam: Alert, Awake, Oriented x3





- Psychiatric Exam


Psychiatric exam: Agitated





- Skin


Skin Exam: Diaphoretic, Normal Color, Warm





Assessment and Plan





- Assessment and Plan (Free Text)


Assessment: 





33 year old male with PMH asthma, presents for sob, chest tightness, bilateral 

upper extermity numbness in 5th digit, and bilateral lower thigh 

weakness/numbness, found to have met SIRS criteria:





Chest tightness:


r/o ACS, vs panic attack


- trop 0.04-0.04-0.03. 


- EKG shows , Sinus tachycardia, T wave inversions in leads V5-V6, lead 

II.


- Echo shows EF 62%. borderline concentric LVH. Grade 1 diastolic dysfunction.


- Cardiology Dr Coe consulted. F/u recs


- currently, the symptoms have resolved.


- UDS neg


- TSH normal, A1C 5.1, lipid panel reviewed - 


- tele monitoring


- JEB 0 





SIRS: 


2/2 likely viral (flu) vs bacterial etiology


- ED: tachycardic, leukocytosis, fever 100.4F


- Given rocephin


- procal high


- on rocephin.


- ID consulted. F/u recs.


- f/u urine, blood cultures, legionella, mycoplasma, strep pneumo


- monitor





Headache:


2/2 sinus pressure


- no neck rigidity


- flonase nasal spray


- tylenol


- will re-asses





B/l 5th digit tingling:


- TSH normal


- F/u b12, folate, vitamin D


- currently resolved.





Hx of Asthma:


- Xopenex prn





PPX: heparin sq, protonix


HHD





Pt seen, examined, assessment and plan discussed with attending, Dr Baumann.





<Katarina Baumann - Last Filed: 11/17/18 19:07>





Objective





- Vital Signs/Intake and Output


Vital Signs (last 24 hours): 


                                        











Temp Pulse Resp BP Pulse Ox


 


 97.9 F   94 H  20   141/91 H  96 


 


 11/17/18 12:00  11/17/18 18:00  11/17/18 12:00  11/17/18 12:00  11/17/18 06:00








Intake and Output: 


                                        











 11/17/18 11/18/18





 18:59 06:59


 


Intake Total 2053 


 


Balance 2053 














- Medications


Medications: 


                               Current Medications





Acetaminophen (Tylenol 325mg Tab)  650 mg PO Q6H PRN


   PRN Reason: Headache


   Last Admin: 11/17/18 18:08 Dose:  650 mg


Aspirin (Ecotrin)  81 mg PO DAILY ALEJANDRA


   Last Admin: 11/17/18 11:17 Dose:  81 mg


Fluticasone Propionate (Flonase)  1 actuation NS DAILY ALEJANDRA


   Last Admin: 11/17/18 11:17 Dose:  1 applic


Heparin Sodium (Porcine) (Heparin)  5,000 units SC Q12 ALEJANDRA; Protocol


   Last Admin: 11/17/18 11:17 Dose:  5,000 units


Ampicillin Sodium/Sulbactam (Sodium 3 gm/ Sodium Chloride)  100 mls @ 200 mls/hr

IVPB Q6 ALEJANDRA; Protocol


   Last Admin: 11/17/18 18:12 Dose:  200 mls/hr


Doxycycline Hyclate 100 mg/ (Sodium Chloride)  100 mls @ 100 mls/hr IVPB Q12 

ALEJANDRA; Protocol


   Last Admin: 11/17/18 14:30 Dose:  100 mls/hr


Levalbuterol HCl (Xopenex)  1.25 mg IH [id number] PRN


   PRN Reason: Shortness of Breath


Pantoprazole Sodium (Protonix Ec Tab)  40 mg PO 0600 [facility]


   Last Admin: 11/17/18 05:49 Dose:  40 mg











- Labs


Labs: 


                                        





                                 11/17/18 06:30 





                                 11/17/18 06:30 











Attending/Attestation





- Attestation


I have personally seen and examined this patient.: Yes


I have fully participated in the care of the patient.: Yes


I have reviewed all pertinent clinical information, including history, physical 

exam and plan: Yes

## 2018-11-16 NOTE — CON
DATE OF CONSULTATION:  11/16/2018



CARDIOLOGY CONSULTATION



REASON FOR CONSULTATION:  Abnormal EKG.



HISTORY OF PRESENT ILLNESS:  The patient is a 33-year-old morbidly obese

male, who presented because of shortness of breath with bilateral hand

numbness and bilateral feet cold sensation.  The patient is unaware of any

prior cardiac history and at this time, he is chest pain free.



SOCIAL HISTORY:  Nonsmoker.  He works as a .



CURRENT MEDICATIONS:  Aspirin 81 mg once a day, Flonase 1 spray daily,

subcutaneous heparin 5000 units every 12 hours, Protonix 40 mg once a day,

Rocephin 1 gm intravenously daily, and Xopenex inhaler every 6 hours p.r.n.



PAST MEDICAL HISTORY:  Bronchial asthma.



PHYSICAL EXAMINATION

GENERAL:  The patient is a young middle-aged male who does not appear to be

in acute distress.

VITAL SIGNS:  Blood pressure 143/91, heart rate 86, temperature 98,

respirations 20.

HEENT:  Normocephalic.

CHEST:  Clear.

HEART:  S1, S2 regular.

ABDOMEN:  Soft.

EXTREMITIES:  No edema.



LABORATORIES:  Today's hemoglobin and hematocrit 13.9 and 48.5, white count

and platelet count are within normal limits.  D-dimer is below 200.  SMA-7

is within normal limits, except glucose of 120.  Troponin 0.04, 0.04, and

0.03.  Urine drug screen is negative.  EKG revealed sinus tachycardia at

the rate of 104.  LVH with repolarization changes.  However, inferolateral

ischemia cannot be completely excluded.  Echocardiographic study,

borderline concentric LVH with normal systolic function, normal segmental

wall motion, and grade 1 abnormal relaxation pattern.



ASSESSMENT:

1.  Chest pain.  Troponin is in indeterminate range.

2.  Morbid obesity.



RECOMMENDATIONS:  Continue on aspirin 81 mg once a day, subcutaneous

heparin 5000 units every 12 hours, IV Rocephin 1 gm daily.  Obtain _____ as

well as head CT scan without contrast.  If imaging is negative, cardiac

catheterization will be considered.  The procedure and its risks were

explained to the patient; however, the patient stated that he will think

about it.





__________________________________________

Earl Coe MD







DD:  11/16/2018 17:41:26

DT:  11/16/2018 18:50:10

[facility] # [id number]

## 2018-11-16 NOTE — CARD
--------------- APPROVED REPORT --------------





Date of service: 11/16/2018



EXAM: Two-dimensional and M-mode echocardiogram with Doppler and 

color Doppler.



INDICATION

Chest Pain 



2D DIMENSIONS 

Left Atrium (2D)3.8   (1.6-4.0cm)IVSd1.3   (0.7-1.1cm)

LVDd4.7   (3.9-5.9cm)PWd1.3   (0.7-1.1cm)

LVDs3.1   (2.5-4.0cm)FS (%) 33.5   %

LVEF (%)62.0   (>50%)



M-Mode DIMENSIONS 

Aortic Root3.40   (2.2-3.7cm)Aortic Cusp Exc.2.00   (1.5-2.0cm)



Aortic Valve

AoV Peak Wvryioip265.0cm/Subha Peak GR.6mmHg



Mitral Valve

MV E Oxuhhqmx65.2cm/sMV A Hjiumtut43.5cm/sE/A ratio1.1



TDI

E/Lateral E'0.0E/Medial E'0.0



Tricuspid Valve

TR Peak Fopukdgm363ee/sRAP UWEYONPF11glEoZR Peak Gr.14mmHg

[id number]



 LEFT VENTRICLE 

The left ventricle is normal size. There is borderline concentric 

left ventricular hypertrophy. The left ventricular function is 

normal. The left ventricular ejection fraction is within the normal 

range. There is normal LV segmental wall motion. Transmitral Doppler 

flow pattern is Grade I-abnormal relaxation pattern.



 RIGHT VENTRICLE 

The right ventricle is normal size. There is normal right ventricular 

wall thickness. The right ventricular systolic function is normal.



 ATRIA 

The left atrium size is normal. The right atrium size is normal.



 AORTIC VALVE 

The aortic valve is normal in structure. There is trace aortic 

regurgitation. There is no aortic valvular stenosis. 



 MITRAL VALVE 

The mitral valve is normal in structure. There is no mitral valve 

regurgitation noted. There is no mitral valve stenosis.



 TRICUSPID VALVE 

The tricuspid valve is normal in structure. There is trace tricuspid 

regurgitation. There is no tricuspid valve stenosis. 



 PULMONIC VALVE 

The pulmonary valve is normal in structure. There is no pulmonic 

valvular regurgitation. 



 GREAT VESSELS 

The aortic root is normal in size.



 PERICARDIAL EFFUSION 

There is a trace pericardial effusion.



<Conclusion>

The left ventricle is normal size.

There is borderline concentric left ventricular hypertrophy.

The left ventricular function is normal.

The left ventricular ejection fraction is within the normal range.

There is normal LV segmental wall motion.

Transmitral Doppler flow pattern is Grade I-abnormal relaxation 

pattern.

## 2018-11-17 LAB
ALBUMIN SERPL-MCNC: 4 G/DL (ref 3–4.8)
ALBUMIN/GLOB SERPL: 1 {RATIO} (ref 1.1–1.8)
ALT SERPL-CCNC: 32 U/L (ref 7–56)
AST SERPL-CCNC: 32 U/L (ref 17–59)
BASOPHILS # BLD AUTO: 0.01 K/MM3 (ref 0–2)
BASOPHILS NFR BLD: 0.1 % (ref 0–3)
BUN SERPL-MCNC: 14 MG/DL (ref 7–21)
CALCIUM SERPL-MCNC: 8.8 MG/DL (ref 8.4–10.5)
EOSINOPHIL # BLD: 0.1 10*3/UL (ref 0–0.7)
EOSINOPHIL NFR BLD: 0.9 % (ref 1.5–5)
ERYTHROCYTE [DISTWIDTH] IN BLOOD BY AUTOMATED COUNT: 13.1 % (ref 11.5–14.5)
GFR NON-AFRICAN AMERICAN: > 60
GRANULOCYTES # BLD: 6.58 10*3/UL (ref 1.4–6.5)
GRANULOCYTES NFR BLD: 74 % (ref 50–68)
HGB BLD-MCNC: 13.5 G/DL (ref 14–18)
LYMPHOCYTES # BLD: 1.6 10*3/UL (ref 1.2–3.4)
LYMPHOCYTES NFR BLD AUTO: 17.5 % (ref 22–35)
MCH RBC QN AUTO: 31.9 PG (ref 25–35)
MCHC RBC AUTO-ENTMCNC: 33.6 G/DL (ref 31–37)
MCV RBC AUTO: 95 FL (ref 80–105)
MONOCYTES # BLD AUTO: 0.7 10*3/UL (ref 0.1–0.6)
MONOCYTES NFR BLD: 7.5 % (ref 1–6)
PLATELET # BLD: 206 10^3/UL (ref 120–450)
PMV BLD AUTO: 9.8 FL (ref 7–11)
RBC # BLD AUTO: 4.23 10^6/UL (ref 3.5–6.1)
WBC # BLD AUTO: 8.9 10^3/UL (ref 4.5–11)

## 2018-11-17 RX ADMIN — FLUTICASONE PROPIONATE SCH APPLIC: 50 SPRAY, METERED NASAL at 11:17

## 2018-11-17 RX ADMIN — PANTOPRAZOLE SODIUM SCH MG: 40 TABLET, DELAYED RELEASE ORAL at 05:49

## 2018-11-17 NOTE — CP.PCM.PN
<Hazel Craft - Last Filed: 11/17/18 16:25>





Subjective





- Date & Time of Evaluation


Date of Evaluation: 11/17/18


Time of Evaluation: 14:00





- Subjective


Subjective: 





Hazel Craft DO PGY-1 Medicine Progress Note for Dr. Fuentes:


Pt was seen and examined this AM at bedside. Overnight, patient had a temp of 

100.0F orally. This AM, patient states that he has a new found rash on his lower

R leg. He states that after his shower yesterday he noticed that his R lower leg

had an area of increased redness. He denies any trauma or bug bite or anything 

of note to the area. He reports that his upper extremity 5th digit tingling has 

resolved. He currently is denying cough, sputum, runny nose. Denies urinary 

symptoms, chest pain, sob, neck pain, diaphoresis, nausea, vomiting, diarrhea, 

leg swelling. He currently denies any other acute complaints at this time.





Objective





- Vital Signs/Intake and Output


Vital Signs (last 24 hours): 


                                        











Temp Pulse Resp BP Pulse Ox


 


 97.9 F   87   20   141/91 H  96 


 


 11/17/18 12:00  11/17/18 12:00  11/17/18 12:00  11/17/18 12:00  11/17/18 06:00








Intake and Output: 


                                        











 11/17/18 11/17/18





 06:59 18:59


 


Intake Total 1200 


 


Balance 1200 














- Medications


Medications: 


                               Current Medications





Acetaminophen (Tylenol 325mg Tab)  650 mg PO Q6H PRN


   PRN Reason: Headache


   Last Admin: 11/17/18 06:16 Dose:  650 mg


Aspirin (Ecotrin)  81 mg PO DAILY ALEJANDRA


   Last Admin: 11/17/18 11:17 Dose:  81 mg


Fluticasone Propionate (Flonase)  1 actuation NS DAILY ALEJANDRA


   Last Admin: 11/17/18 11:17 Dose:  1 applic


Heparin Sodium (Porcine) (Heparin)  5,000 units SC Q12 ALEJANDRA; Protocol


   Last Admin: 11/17/18 11:17 Dose:  5,000 units


Ampicillin Sodium/Sulbactam (Sodium 3 gm/ Sodium Chloride)  100 mls @ 200 mls/hr

IVPB Q6 ALEJANDRA; Protocol


Doxycycline Hyclate 100 mg/ (Sodium Chloride)  100 mls @ 100 mls/hr IVPB Q12 

ALEJANDRA; Protocol


Levalbuterol HCl (Xopenex)  1.25 mg IH [id number] PRN


   PRN Reason: Shortness of Breath


Pantoprazole Sodium (Protonix Ec Tab)  40 mg PO 0600 [facility]DRA


   Last Admin: 11/17/18 05:49 Dose:  40 mg











- Labs


Labs: 


                                        





                                 11/17/18 06:30 





                                 11/17/18 06:30 











- Constitutional


Appears: Well, Non-toxic, No Acute Distress





- Head Exam


Head Exam: ATRAUMATIC, NORMAL INSPECTION, NORMOCEPHALIC





- Eye Exam


Eye Exam: EOMI, Normal appearance, PERRL





- Respiratory Exam


Respiratory Exam: Clear to Ausculation Bilateral, NORMAL BREATHING PATTERN.  

absent: Accessory Muscle Use, Rales, Rhonchi, Wheezes, Respiratory Distress, 

Stridor





- Cardiovascular Exam


Cardiovascular Exam: RRR, +S1, +S2.  absent: Gallop, Rubs





- GI/Abdominal Exam


GI & Abdominal Exam: Soft, Normal Bowel Sounds.  absent: Tenderness





- Extremities Exam


Additional comments: 





Pt has an area of increased erythema on anterior R shin that is tender to 

palpation. There is no noted area of drainage or fluctuance noted on exam. 





- Back Exam


Back Exam: NORMAL INSPECTION.  absent: CVA tenderness (L), CVA tenderness (R)





- Neurological Exam


Neurological Exam: Alert, Awake, Oriented x3





- Psychiatric Exam


Psychiatric exam: Normal Affect, Normal Mood





- Skin


Skin Exam: Dry, Intact, Normal Color (except where noted above), Warm





Assessment and Plan





- Assessment and Plan (Free Text)


Assessment: 





33 year old male with PMH asthma, presents for sob, chest tightness, bilateral 

upper extermity numbness in 5th digit, and bilateral lower thigh 

weakness/numbness. He currently has new found area of potential cellulitis in R 

LE. 


Plan: 





1. Chest tightness r/o ACS, vs panic attack:


- trop 0.04-0.04-0.03. 


- EKG shows , Sinus tachycardia, T wave inversions in leads V5-V6, lead 

II.


- Echo shows EF 62%. borderline concentric LVH. Grade 1 diastolic dysfunction.


- Cardiology Dr Coe consulted. F/u recs


- currently, the symptoms have resolved.


- UDS neg


- TSH normal, A1C 5.1, lipid panel reviewed - 


- tele monitoring


- JEB 0 


- Pt considering possibility of cath procedure on Monday.





2. Erythematous rash likely 2/2 Cellulitis on R LE:


- Pt denies any bites, and denies trauma to the area, no area of erythema or 

discharge, tender to the touch.


- ID on board, will continue unasyn and potentially add doxy depending on blood 

cx


- Will continue to monitor





3. SIRS 2/2 likely viral (flu) vs bacterial etiology:


- ED: tachycardic, leukocytosis, fever 100.4F


- ID consulted recs appreciated


- Now on Unasyn, ID may add doxy pending blood cxs


- procal high


- f/u urine, blood cultures, legionella, and flu are all negative at this point


- f/u mycoplasma, strep pneumo


- monitor





4. Headache 2/2 sinus pressure: Improving


- no neck rigidity


- flonase nasal spray


- tylenol


- will re-asses





5. B/l 5th digit tingling: Resolved


- TSH normal


- F/u b12, folate both wnl


- vitamin D is low, will replete





6. Hx of Asthma:


- Xopenex prn





7. PPX: heparin sq, protonix


HHD





Pt seen, examined, assessment and plan discussed with attending, Dr Gina Craft, DO 


Internal Medicine Resident PGY-1





<Sivakumar Fuentes - Last Filed: 11/17/18 17:03>





Objective





- Vital Signs/Intake and Output


Vital Signs (last 24 hours): 


                                        











Temp Pulse Resp BP Pulse Ox


 


 97.9 F   87   20   141/91 H  96 


 


 11/17/18 12:00  11/17/18 12:00  11/17/18 12:00  11/17/18 12:00  11/17/18 06:00








Intake and Output: 


                                        











 11/17/18 11/17/18





 06:59 18:59


 


Intake Total 1200 


 


Balance 1200 














- Medications


Medications: 


                               Current Medications





Acetaminophen (Tylenol 325mg Tab)  650 mg PO Q6H PRN


   PRN Reason: Headache


   Last Admin: 11/17/18 06:16 Dose:  650 mg


Aspirin (Ecotrin)  81 mg PO DAILY ALEJANDRA


   Last Admin: 11/17/18 11:17 Dose:  81 mg


Fluticasone Propionate (Flonase)  1 actuation NS DAILY [facility]


   Last Admin: 11/17/18 11:17 Dose:  1 applic


Heparin Sodium (Porcine) (Heparin)  5,000 units SC Q12 ALEJANDRA; Protocol


   Last Admin: 11/17/18 11:17 Dose:  5,000 units


Ampicillin Sodium/Sulbactam (Sodium 3 gm/ Sodium Chloride)  100 mls @ 200 mls/hr

IVPB Q6 ALEJANDRA; Protocol


Doxycycline Hyclate 100 mg/ (Sodium Chloride)  100 mls @ 100 mls/hr IVPB Q12 

ALEJANDRA; Protocol


   Last Admin: 11/17/18 14:30 Dose:  100 mls/hr


Levalbuterol HCl (Xopenex)  1.25 mg IH [id number] PRN


   PRN Reason: Shortness of Breath


Pantoprazole Sodium (Protonix Ec Tab)  40 mg PO 0600 ALEJANDRA


   Last Admin: 11/17/18 05:49 Dose:  40 mg











- Labs


Labs: 


                                        





                                 11/17/18 06:30 





                                 11/17/18 06:30 











Attending/Attestation





- Attestation


I have personally seen and examined this patient.: Yes


I have fully participated in the care of the patient.: Yes


I have reviewed all pertinent clinical information, including history, physical 

exam and plan: Yes


Notes (Text): 





11/17/18 16:58





Medical record note made by the resident after discussion with my direction and 

input after the patient was personally seen and examined by me. I have reviewed 

the chart and agree that the record accurately reflects by personal performance 

of the history, physical exam, data review, and medical decision-making, in the 

course for the patient. I have also personally directed the plan of care.





33 year old male with PMH  of Morbid Obesity, asthma, presents for sob, chest 

tightness, bilateral upper extermity numbness in 5th digit, and bilateral lower 

thigh weakness/numbness.


Headache and Numbness has resolved.


Troponins were boderline, Patient is pain free, Echo showed LVH, refusing any 

further cardiology work up as recommended by cardiology.


Patient was found to be febrile  due to right lower leg cellulitis,  has 

elevated Procalcitonin , will start patient on Unasyn and Vancomycin and will 

monitor.








11/17/18 16:59





11/17/18 17:02

## 2018-11-17 NOTE — CP.PCM.CON
History of Present Illness





- History of Present Illness


History of Present Illness: 


33 year old male with PMH of asthma, morbid obesity with BMI 46 came in to [facility] [place] 

initially for bilateral 5th digit numbness which has resolved currently. He is 

now complaining of right lower extremity pain associated with erythematous rash 

which just came out yesterday. He was also having low grade fever yesterday. He 

denies headache or dizziness, no nausea or vomiting, no diarrhea, no abdominal 

pain, no dysuria, no chest pain or palpitations, no sore throat, no cough or 

rhinorrhea. The patient denies soaking in bathtubs. He does have contact with 

dogs but denies animal bites or bug bites and has not traveled to wooded areas 

in the past 3 months. He is not complaining of back pain. Infectious diseases 

consult is requested to further evaluate and manage.





Review of Systems





- Review of Systems


All systems: reviewed and no additional remarkable complaints except (as per 

HPI)





Past Patient History





- Past Social History


Smoking Status: Unknown If Ever Smoked





- CARDIAC


Hx Cardiac Disorders: No





- PULMONARY


Hx Asthma: Yes





- NEUROLOGICAL


Hx Neurological Disorder: No





- HEENT


Hx HEENT Problems: No





- RENAL


Hx Chronic Kidney Disease: No





- ENDOCRINE/METABOLIC


Hx Endocrine Disorders: No





- HEMATOLOGICAL/ONCOLOGICAL


Hx Blood Disorders: No





- INTEGUMENTARY


Hx Dermatological Problems: No





- MUSCULOSKELETAL/RHEUMATOLOGICAL


Hx Musculoskeletal Disorders: No


Hx Falls: No





- GASTROINTESTINAL


Hx Gastrointestinal Disorders: No





- GENITOURINARY/GYNECOLOGICAL


Hx Genitourinary Disorders: No





- PSYCHIATRIC


Hx Psychophysiologic Disorder: No





- SURGICAL HISTORY


Hx Surgeries: No





Meds


Allergies/Adverse Reactions: 


                                    Allergies











Allergy/AdvReac Type Severity Reaction Status Date / Time


 


No Known Allergies Allergy   Verified 11/15/18 16:52














- Medications


Medications: 


                               Current Medications





Acetaminophen (Tylenol 325mg Tab)  650 mg PO Q6H PRN


   PRN Reason: Headache


   Last Admin: 11/16/18 09:33 Dose:  650 mg


Aspirin (Ecotrin)  81 mg PO DAILY [facility]


   Last Admin: 11/16/18 09:31 Dose:  81 mg


Fluticasone Propionate (Flonase)  1 actuation NS DAILY [facility]


   Last Admin: 11/16/18 13:35 Dose:  1 applic


Heparin Sodium (Porcine) (Heparin)  5,000 units SC Q12 [facility]; Protocol


   Last Admin: 11/16/18 09:31 Dose:  5,000 units


Ceftriaxone Sodium (Rocephin 1 Gram Ivpb)  1 gm in 100 mls @ 100 mls/hr IVPB 

DAILY [facility]; Protocol


   Last Admin: 11/16/18 09:31 Dose:  100 mls/hr


Levalbuterol HCl (Xopenex)  1.25 mg IH [id number] PRN


   PRN Reason: Shortness of Breath


Pantoprazole Sodium (Protonix Ec Tab)  40 mg PO 0600 [facility]


   Last Admin: 11/16/18 09:31 Dose:  40 mg











Physical Exam





- Constitutional


Appears: Chronically Ill





- Head Exam


Head Exam: NORMAL INSPECTION





- Neck Exam


Neck exam: Negative for: Meningismus





- Respiratory Exam


Respiratory Exam: Decreased Breath Sounds





- Cardiovascular Exam


Cardiovascular Exam: +S1, +S2





- GI/Abdominal Exam


GI & Abdominal Exam: Soft.  absent: Tenderness





- Extremities Exam


Additional comments: 





right leg with erythema on the anterior portion with mild tenderness, no pus, 

bleeding or dishcarge, no fluctuance





Results





- Vital Signs


Recent Vital Signs: 


                                Last Vital Signs











Temp  99.8 F H  11/16/18 18:00


 


Pulse  93 H  11/16/18 18:00


 


Resp  19   11/16/18 18:00


 


BP  140/96 H  11/16/18 18:00


 


Pulse Ox  97   11/16/18 09:55














- Labs


Result Diagrams: 


                                 11/17/18 06:30





                                 11/17/18 06:30


Labs: 


                         Laboratory Results - last 24 hr











  11/15/18 11/15/18 11/15/18





  17:52 18:13 18:13


 


WBC   


 


RBC   


 


Hgb   


 


Hct   


 


MCV   


 


MCH   


 


MCHC   


 


RDW   


 


Plt Count   


 


MPV   


 


Gran %   


 


Lymph % (Auto)   


 


Mono % (Auto)   


 


Eos % (Auto)   


 


Baso % (Auto)   


 


Gran #   


 


Lymph # (Auto)   


 


Mono # (Auto)   


 


Eos # (Auto)   


 


Baso # (Auto)   


 


Neutrophils % (Manual)  94 H  


 


Band Neutrophils %  1  


 


Lymphocytes % (Manual)  1 L  


 


Monocytes % (Manual)  4  


 


Platelet Evaluation  Normal  


 


D-Dimer, Quantitative   


 


pO2   


 


VBG pH   


 


VBG pCO2   


 


VBG HCO3   


 


VBG Total CO2   


 


VBG O2 Sat (Calc)   


 


VBG Base Excess   


 


VBG Potassium   


 


Glucose   


 


Lactate   


 


FiO2   


 


Sodium   


 


Potassium   


 


Chloride   


 


Carbon Dioxide   


 


Anion Gap   


 


BUN   


 


Creatinine   


 


Est GFR ( Amer)   


 


Est GFR (Non-Af Amer)   


 


Random Glucose   


 


Hemoglobin A1c   


 


Calcium   


 


Phosphorus   


 


Magnesium   


 


Total Bilirubin   


 


AST   


 


ALT   


 


Alkaline Phosphatase   


 


Total Creatine Kinase   


 


Troponin I   


 


Total Protein   


 


Albumin   


 


Globulin   


 


Albumin/Globulin Ratio   


 


Triglycerides   


 


Cholesterol   


 


LDL Cholesterol Direct   


 


HDL Cholesterol   


 


Vitamin B12   


 


25-OH Vitamin D Total   


 


Folate   


 


Procalcitonin   


 


TSH 3rd Generation   


 


Venous Blood Potassium   


 


Urine Color   Yellow 


 


Urine Appearance   Sl cloudy 


 


Urine pH   7.5 


 


Ur Specific Gravity   1.020 


 


Urine Protein   Trace H 


 


Urine Glucose (UA)   Negative 


 


Urine Ketones   Negative 


 


Urine Blood   Trace-intact H 


 


Urine Nitrate   Negative 


 


Urine Bilirubin   Negative 


 


Urine Urobilinogen   0.2 


 


Ur Leukocyte Esterase   Negative 


 


Urine RBC   0 - 2 


 


Urine WBC   Negative 


 


Urine Opiates Screen    Negative


 


Urine Methadone Screen    Negative


 


Ur Barbiturates Screen    Negative


 


Ur Phencyclidine Scrn    Negative


 


Ur Amphetamines Screen    Negative


 


U Benzodiazepines Scrn    Negative


 


U Oth Cocaine Metabols    Negative


 


U Cannabinoids Screen    Negative














  11/15/18 11/15/18 11/15/18





  18:51 19:15 19:29


 


WBC   


 


RBC   


 


Hgb   


 


Hct   


 


MCV   


 


MCH   


 


MCHC   


 


RDW   


 


Plt Count   


 


MPV   


 


Gran %   


 


Lymph % (Auto)   


 


Mono % (Auto)   


 


Eos % (Auto)   


 


Baso % (Auto)   


 


Gran #   


 


Lymph # (Auto)   


 


Mono # (Auto)   


 


Eos # (Auto)   


 


Baso # (Auto)   


 


Neutrophils % (Manual)   


 


Band Neutrophils %   


 


Lymphocytes % (Manual)   


 


Monocytes % (Manual)   


 


Platelet Evaluation   


 


D-Dimer, Quantitative    < 200


 


pO2   


 


VBG pH   


 


VBG pCO2   


 


VBG HCO3   


 


VBG Total CO2   


 


VBG O2 Sat (Calc)   


 


VBG Base Excess   


 


VBG Potassium   


 


Glucose   


 


Lactate   


 


FiO2   


 


Sodium  137  


 


Potassium  4.3  


 


Chloride  97 L  


 


Carbon Dioxide  31  


 


Anion Gap  13  


 


BUN  12  


 


Creatinine  1.3  


 


Est GFR ( Amer)  > 60  


 


Est GFR (Non-Af Amer)  > 60  


 


Random Glucose  116 H  


 


Hemoglobin A1c   


 


Calcium  9.4  


 


Phosphorus  3.5  


 


Magnesium  1.5 L  


 


Total Bilirubin  1.1  


 


AST  24  


 


ALT  32  


 


Alkaline Phosphatase  64  


 


Total Creatine Kinase  63  


 


Troponin I  0.04  


 


Total Protein  8.0  


 


Albumin  4.3  


 


Globulin  3.7  


 


Albumin/Globulin Ratio  1.2  


 


Triglycerides   


 


Cholesterol   


 


LDL Cholesterol Direct   


 


HDL Cholesterol   


 


Vitamin B12   


 


25-OH Vitamin D Total   


 


Folate   


 


Procalcitonin   


 


TSH 3rd Generation   0.51 


 


Venous Blood Potassium   


 


Urine Color   


 


Urine Appearance   


 


Urine pH   


 


Ur Specific Gravity   


 


Urine Protein   


 


Urine Glucose (UA)   


 


Urine Ketones   


 


Urine Blood   


 


Urine Nitrate   


 


Urine Bilirubin   


 


Urine Urobilinogen   


 


Ur Leukocyte Esterase   


 


Urine RBC   


 


Urine WBC   


 


Urine Opiates Screen   


 


Urine Methadone Screen   


 


Ur Barbiturates Screen   


 


Ur Phencyclidine Scrn   


 


Ur Amphetamines Screen   


 


U Benzodiazepines Scrn   


 


U Oth Cocaine Metabols   


 


U Cannabinoids Screen   














  11/16/18 11/16/18 11/16/18





  00:17 00:17 00:49


 


WBC   


 


RBC   


 


Hgb   


 


Hct   


 


MCV   


 


MCH   


 


MCHC   


 


RDW   


 


Plt Count   


 


MPV   


 


Gran %   


 


Lymph % (Auto)   


 


Mono % (Auto)   


 


Eos % (Auto)   


 


Baso % (Auto)   


 


Gran #   


 


Lymph # (Auto)   


 


Mono # (Auto)   


 


Eos # (Auto)   


 


Baso # (Auto)   


 


Neutrophils % (Manual)   


 


Band Neutrophils %   


 


Lymphocytes % (Manual)   


 


Monocytes % (Manual)   


 


Platelet Evaluation   


 


D-Dimer, Quantitative   


 


pO2   19 L 


 


VBG pH   7.43 


 


VBG pCO2   52.0 


 


VBG HCO3   34.5 H 


 


VBG Total CO2   36.1 H 


 


VBG O2 Sat (Calc)   32.8 L 


 


VBG Base Excess   8.5 H 


 


VBG Potassium   3.9 


 


Glucose   123 H 


 


Lactate   1.6 


 


FiO2   21.0 


 


Sodium   137.0 


 


Potassium   


 


Chloride   97.0 L 


 


Carbon Dioxide   


 


Anion Gap   


 


BUN   


 


Creatinine   


 


Est GFR ( Amer)   


 


Est GFR (Non-Af Amer)   


 


Random Glucose   


 


Hemoglobin A1c   


 


Calcium   


 


Phosphorus   


 


Magnesium   


 


Total Bilirubin   


 


AST   


 


ALT   


 


Alkaline Phosphatase   


 


Total Creatine Kinase   


 


Troponin I    0.04


 


Total Protein   


 


Albumin   


 


Globulin   


 


Albumin/Globulin Ratio   


 


Triglycerides   


 


Cholesterol   


 


LDL Cholesterol Direct   


 


HDL Cholesterol   


 


Vitamin B12   


 


25-OH Vitamin D Total   


 


Folate   


 


Procalcitonin  3.53 H  


 


TSH 3rd Generation   


 


Venous Blood Potassium   3.9 


 


Urine Color   


 


Urine Appearance   


 


Urine pH   


 


Ur Specific Gravity   


 


Urine Protein   


 


Urine Glucose (UA)   


 


Urine Ketones   


 


Urine Blood   


 


Urine Nitrate   


 


Urine Bilirubin   


 


Urine Urobilinogen   


 


Ur Leukocyte Esterase   


 


Urine RBC   


 


Urine WBC   


 


Urine Opiates Screen   


 


Urine Methadone Screen   


 


Ur Barbiturates Screen   


 


Ur Phencyclidine Scrn   


 


Ur Amphetamines Screen   


 


U Benzodiazepines Scrn   


 


U Oth Cocaine Metabols   


 


U Cannabinoids Screen   














  11/16/18 11/16/18 11/16/18





  06:20 06:20 06:20


 


WBC   10.1  D 


 


RBC   4.32 


 


Hgb   13.9 L 


 


Hct   40.5 L 


 


MCV   93.8 


 


MCH   32.2 


 


MCHC   34.3 


 


RDW   13.1 


 


Plt Count   205 


 


MPV   9.8 


 


Gran %   85.8 H 


 


Lymph % (Auto)   8.4 L 


 


Mono % (Auto)   5.8 


 


Eos % (Auto)   0.0 L 


 


Baso % (Auto)   0.0 


 


Gran #   8.67 H 


 


Lymph # (Auto)   0.9 L 


 


Mono # (Auto)   0.6 


 


Eos # (Auto)   0.0 


 


Baso # (Auto)   0.00 


 


Neutrophils % (Manual)   


 


Band Neutrophils %   


 


Lymphocytes % (Manual)   


 


Monocytes % (Manual)   


 


Platelet Evaluation   


 


D-Dimer, Quantitative   


 


pO2   


 


VBG pH   


 


VBG pCO2   


 


VBG HCO3   


 


VBG Total CO2   


 


VBG O2 Sat (Calc)   


 


VBG Base Excess   


 


VBG Potassium   


 


Glucose   


 


Lactate   


 


FiO2   


 


Sodium    137


 


Potassium    4.2


 


Chloride    98


 


Carbon Dioxide    31


 


Anion Gap    12


 


BUN    15


 


Creatinine    1.3


 


Est GFR ( Amer)    > 60


 


Est GFR (Non-Af Amer)    > 60


 


Random Glucose    120 H


 


Hemoglobin A1c  5.1  


 


Calcium    9.0


 


Phosphorus   


 


Magnesium    2.0


 


Total Bilirubin    1.0


 


AST    25


 


ALT    32


 


Alkaline Phosphatase    61


 


Total Creatine Kinase   


 


Troponin I    0.03  D


 


Total Protein    7.4


 


Albumin    3.9


 


Globulin    3.5


 


Albumin/Globulin Ratio    1.1


 


Triglycerides    151


 


Cholesterol    178


 


LDL Cholesterol Direct    114


 


HDL Cholesterol    42


 


Vitamin B12   


 


25-OH Vitamin D Total   


 


Folate   


 


Procalcitonin   


 


TSH 3rd Generation   


 


Venous Blood Potassium   


 


Urine Color   


 


Urine Appearance   


 


Urine pH   


 


Ur Specific Gravity   


 


Urine Protein   


 


Urine Glucose (UA)   


 


Urine Ketones   


 


Urine Blood   


 


Urine Nitrate   


 


Urine Bilirubin   


 


Urine Urobilinogen   


 


Ur Leukocyte Esterase   


 


Urine RBC   


 


Urine WBC   


 


Urine Opiates Screen   


 


Urine Methadone Screen   


 


Ur Barbiturates Screen   


 


Ur Phencyclidine Scrn   


 


Ur Amphetamines Screen   


 


U Benzodiazepines Scrn   


 


U Oth Cocaine Metabols   


 


U Cannabinoids Screen   














  11/16/18 11/16/18





  06:30 07:30


 


WBC  


 


RBC  


 


Hgb  


 


Hct  


 


MCV  


 


MCH  


 


MCHC  


 


RDW  


 


Plt Count  


 


MPV  


 


Gran %  


 


Lymph % (Auto)  


 


Mono % (Auto)  


 


Eos % (Auto)  


 


Baso % (Auto)  


 


Gran #  


 


Lymph # (Auto)  


 


Mono # (Auto)  


 


Eos # (Auto)  


 


Baso # (Auto)  


 


Neutrophils % (Manual)  


 


Band Neutrophils %  


 


Lymphocytes % (Manual)  


 


Monocytes % (Manual)  


 


Platelet Evaluation  


 


D-Dimer, Quantitative  


 


pO2  


 


VBG pH  


 


VBG pCO2  


 


VBG HCO3  


 


VBG Total CO2  


 


VBG O2 Sat (Calc)  


 


VBG Base Excess  


 


VBG Potassium  


 


Glucose  


 


Lactate  


 


FiO2  


 


Sodium  


 


Potassium  


 


Chloride  


 


Carbon Dioxide  


 


Anion Gap  


 


BUN  


 


Creatinine  


 


Est GFR ( Amer)  


 


Est GFR (Non-Af Amer)  


 


Random Glucose  


 


Hemoglobin A1c  


 


Calcium  


 


Phosphorus   3.4


 


Magnesium  


 


Total Bilirubin  


 


AST  


 


ALT  


 


Alkaline Phosphatase  


 


Total Creatine Kinase  


 


Troponin I  


 


Total Protein  


 


Albumin  


 


Globulin  


 


Albumin/Globulin Ratio  


 


Triglycerides  


 


Cholesterol  


 


LDL Cholesterol Direct  


 


HDL Cholesterol  


 


Vitamin B12   295


 


25-OH Vitamin D Total  < 12.8 L 


 


Folate   7.8


 


Procalcitonin  


 


TSH 3rd Generation  


 


Venous Blood Potassium  


 


Urine Color  


 


Urine Appearance  


 


Urine pH  


 


Ur Specific Gravity  


 


Urine Protein  


 


Urine Glucose (UA)  


 


Urine Ketones  


 


Urine Blood  


 


Urine Nitrate  


 


Urine Bilirubin  


 


Urine Urobilinogen  


 


Ur Leukocyte Esterase  


 


Urine RBC  


 


Urine WBC  


 


Urine Opiates Screen  


 


Urine Methadone Screen  


 


Ur Barbiturates Screen  


 


Ur Phencyclidine Scrn  


 


Ur Amphetamines Screen  


 


U Benzodiazepines Scrn  


 


U Oth Cocaine Metabols  


 


U Cannabinoids Screen  














Assessment & Plan





- Assessment and Plan (Free Text)


Plan: 





Assessment


right leg erythematous rash, R/O skin and skin structure infection


asthma


morbid obesity with BMI 46





Plan


patient has been started on Unasyn and will add Doxycycline pending blood cx


will monitor clinical response


follow up HIV test

## 2018-11-18 VITALS — SYSTOLIC BLOOD PRESSURE: 150 MMHG | DIASTOLIC BLOOD PRESSURE: 98 MMHG | RESPIRATION RATE: 21 BRPM | HEART RATE: 81 BPM

## 2018-11-18 VITALS — TEMPERATURE: 98.7 F | OXYGEN SATURATION: 97 %

## 2018-11-18 LAB
ALBUMIN SERPL-MCNC: 3.8 G/DL (ref 3–4.8)
ALBUMIN/GLOB SERPL: 1 {RATIO} (ref 1.1–1.8)
ALT SERPL-CCNC: 42 U/L (ref 7–56)
AST SERPL-CCNC: 31 U/L (ref 17–59)
BASOPHILS # BLD AUTO: 0.01 K/MM3 (ref 0–2)
BASOPHILS NFR BLD: 0.2 % (ref 0–3)
BUN SERPL-MCNC: 14 MG/DL (ref 7–21)
CALCIUM SERPL-MCNC: 9.1 MG/DL (ref 8.4–10.5)
EOSINOPHIL # BLD: 0.2 10*3/UL (ref 0–0.7)
EOSINOPHIL NFR BLD: 3.5 % (ref 1.5–5)
ERYTHROCYTE [DISTWIDTH] IN BLOOD BY AUTOMATED COUNT: 13.1 % (ref 11.5–14.5)
GFR NON-AFRICAN AMERICAN: > 60
GRANULOCYTES # BLD: 3.39 10*3/UL (ref 1.4–6.5)
GRANULOCYTES NFR BLD: 56.7 % (ref 50–68)
HGB BLD-MCNC: 13.8 G/DL (ref 14–18)
LYMPHOCYTES # BLD: 1.9 10*3/UL (ref 1.2–3.4)
LYMPHOCYTES NFR BLD AUTO: 32.2 % (ref 22–35)
MCH RBC QN AUTO: 31.8 PG (ref 25–35)
MCHC RBC AUTO-ENTMCNC: 33.5 G/DL (ref 31–37)
MCV RBC AUTO: 94.9 FL (ref 80–105)
MONOCYTES # BLD AUTO: 0.4 10*3/UL (ref 0.1–0.6)
MONOCYTES NFR BLD: 7.4 % (ref 1–6)
PLATELET # BLD: 203 10^3/UL (ref 120–450)
PMV BLD AUTO: 9.6 FL (ref 7–11)
RBC # BLD AUTO: 4.34 10^6/UL (ref 3.5–6.1)
WBC # BLD AUTO: 6 10^3/UL (ref 4.5–11)

## 2018-11-18 RX ADMIN — FLUTICASONE PROPIONATE SCH APPLIC: 50 SPRAY, METERED NASAL at 11:01

## 2018-11-18 RX ADMIN — PANTOPRAZOLE SODIUM SCH MG: 40 TABLET, DELAYED RELEASE ORAL at 05:32

## 2018-11-18 NOTE — CT
Date of service: 11/18/2018



PROCEDURE:  CT HEAD WITHOUT CONTRAST.



HISTORY:

hand numbness



COMPARISON:

None available.



TECHNIQUE:

Axial computed tomography images were obtained through the head/brain 

without intravenous contrast.  



Radiation dose:



Total exam DLP = 849.11 mGy-cm.



This CT exam was performed using one or more of the following dose 

reduction techniques: Automated exposure control, adjustment of the 

mA and/or kV according to patient size, and/or use of iterative 

reconstruction technique.



FINDINGS:



HEMORRHAGE:

No intracranial hemorrhage. 



BRAIN:

No mass effect or edema.  No atrophy or chronic microvascular 

ischemic changes.  Please note that MRI with diffusion imaging is 

more sensitive in the detection of acute ischemic event.



VENTRICLES:

No hydrocephalus. 



CALVARIUM:

Unremarkable.



PARANASAL SINUSES:

Unremarkable as visualized. No significant inflammatory changes.



MASTOID AIR CELLS:

Unremarkable as visualized. No inflammatory changes.



OTHER FINDINGS:

None.



IMPRESSION:

No acute intracranial pathology identified.

## 2018-11-18 NOTE — CP.PCM.DIS
<Hazel Craft - Last Filed: 11/18/18 19:08>





Provider





- Provider


Date of Admission: 


11/16/18 10:44





Attending physician: 


Indiana Reynoso DO





Time Spent in preparation of Discharge (in minutes): 45





Diagnosis





- Discharge Diagnosis


(1) Acute electrocardiogram changes


Status: Acute   





(2) Dyspnea


Status: Acute   





Hospital Course





- Lab Results


Lab Results: 


                                  Micro Results





11/16/18 00:17   Blood   Blood Culture - Preliminary


                            NO GROWTH AFTER 48 HOURS


11/16/18 14:17   Urine,Clean Catch   Urine Culture - Final


                            No Growth (<1,000 CFU/ML)





                             Most Recent Lab Values











WBC  6.0 10^3/uL (4.5-11.0)  D 11/18/18  05:00    


 


RBC  4.34 10^6/uL (3.5-6.1)   11/18/18  05:00    


 


Hgb  13.8 g/dL (14.0-18.0)  L  11/18/18  05:00    


 


Hct  41.2 % (42.0-52.0)  L  11/18/18  05:00    


 


MCV  94.9 fl (80.0-105.0)   11/18/18  05:00    


 


MCH  31.8 pg (25.0-35.0)   11/18/18  05:00    


 


MCHC  33.5 g/dl (31.0-37.0)   11/18/18  05:00    


 


RDW  13.1 % (11.5-14.5)   11/18/18  05:00    


 


Plt Count  203 10^3/uL (120.0-450.0)   11/18/18  05:00    


 


MPV  9.6 fl (7.0-11.0)   11/18/18  05:00    


 


Gran %  56.7 % (50.0-68.0)   11/18/18  05:00    


 


Lymph % (Auto)  32.2 % (22.0-35.0)   11/18/18  05:00    


 


Mono % (Auto)  7.4 % (1.0-6.0)  H  11/18/18  05:00    


 


Eos % (Auto)  3.5 % (1.5-5.0)   11/18/18  05:00    


 


Baso % (Auto)  0.2 % (0.0-3.0)   11/18/18  05:00    


 


Gran #  3.39  (1.4-6.5)   11/18/18  05:00    


 


Lymph # (Auto)  1.9  (1.2-3.4)   11/18/18  05:00    


 


Mono # (Auto)  0.4  (0.1-0.6)   11/18/18  05:00    


 


Eos # (Auto)  0.2  (0.0-0.7)   11/18/18  05:00    


 


Baso # (Auto)  0.01 K/mm3 (0.0-2.0)   11/18/18  05:00    


 


Neutrophils % (Manual)  94 % (50.0-70.0)  H  11/15/18  17:52    


 


Band Neutrophils %  1 % (0-2)   11/15/18  17:52    


 


Lymphocytes % (Manual)  1 % (22.0-35.0)  L  11/15/18  17:52    


 


Monocytes % (Manual)  4 % (1.0-6.0)   11/15/18  17:52    


 


Platelet Evaluation  Normal  (NORMAL)   11/15/18  17:52    


 


D-Dimer, Quantitative  < 200 ng/mlDDU (0-243)   11/15/18  19:29    


 


pCO2  31 mm/Hg (35-45)  L  11/15/18  17:00    


 


pO2  19 mm/Hg (30-55)  L  11/16/18  00:17    


 


HCO3  25.3 mmol/L (21-28)   11/15/18  17:00    


 


ABG pH  7.52  (7.35-7.45)  H  11/15/18  17:00    


 


ABG Total CO2  26.3 mmol.L (22-28)   11/15/18  17:00    


 


ABG O2 Saturation  99.4 % (95-98)  H  11/15/18  17:00    


 


ABG O2 Content  20.2 ML/dl (15-23)   11/15/18  17:00    


 


ABG Base Excess  3.2 mmol/L (-2.0-3.0)  H  11/15/18  17:00    


 


ABG Hemoglobin  14.9 g/dL (11.7-17.4)   11/15/18  17:00    


 


ABG Carboxyhemoglobin  2.2 % (0.5-1.5)  H  11/15/18  17:00    


 


POC ABG HHb (Measured)  0.6 % (0-5)   11/15/18  17:00    


 


ABG Methemoglobin  1.1 % (0.0-3.0)   11/15/18  17:00    


 


ABG O2 Capacity  20.3 mL/dl (16-24)   11/15/18  17:00    


 


VBG pH  7.43  (7.32-7.43)   11/16/18  00:17    


 


VBG pCO2  52.0  (40-60)   11/16/18  00:17    


 


VBG HCO3  34.5 mmol/l (21-28)  H  11/16/18  00:17    


 


VBG Total CO2  36.1 mmol.L (22-28)  H  11/16/18  00:17    


 


VBG O2 Sat (Calc)  32.8 % (40-65)  L  11/16/18  00:17    


 


VBG Base Excess  8.5 mmol/L (0.0-2.0)  H  11/16/18  00:17    


 


VBG Potassium  3.9 mmol/L (3.6-5.2)   11/16/18  00:17    


 


Hgb O2 Saturation  96.1 % (95.0-98.0)   11/15/18  17:00    


 


Sodium  137.0 mmol/L (132-148)   11/16/18  00:17    


 


Chloride  97.0 mmol/L ()  L  11/16/18  00:17    


 


Glucose  123 mg/dl ()  H  11/16/18  00:17    


 


Lactate  1.6 mmol/L (0.7-2.1)   11/16/18  00:17    


 


FiO2  21.0 %  11/16/18  00:17    


 


Sodium  140 mmol/L (132-148)   11/18/18  05:00    


 


Potassium  4.5 mmol/L (3.6-5.0)   11/18/18  05:00    


 


Chloride  103 mmol/L ()   11/18/18  05:00    


 


Carbon Dioxide  28 mmol/L (21-33)   11/18/18  05:00    


 


Anion Gap  13  (10-20)   11/18/18  05:00    


 


BUN  14 mg/dL (7-21)   11/18/18  05:00    


 


Creatinine  1.0 mg/dl (0.8-1.5)   11/18/18  05:00    


 


Est GFR ( Amer)  > 60   11/18/18  05:00    


 


Est GFR (Non-Af Amer)  > 60   11/18/18  05:00    


 


Random Glucose  109 mg/dL ()   11/18/18  05:00    


 


Hemoglobin A1c  5.1 % (4.2-6.5)   11/16/18  06:20    


 


Calcium  9.1 mg/dL (8.4-10.5)   11/18/18  05:00    


 


Phosphorus  3.8 mg/dL (2.5-4.5)   11/18/18  05:00    


 


Magnesium  2.1 mg/dL (1.7-2.2)   11/17/18  06:30    


 


Total Bilirubin  0.7 mg/dL (0.2-1.3)   11/18/18  05:00    


 


AST  31 U/L (17-59)   11/18/18  05:00    


 


ALT  42 U/L (7-56)   11/18/18  05:00    


 


Alkaline Phosphatase  60 U/L ()   11/18/18  05:00    


 


Total Creatine Kinase  63 U/L ()   11/15/18  18:51    


 


Troponin I  0.03 ng/mL D 11/16/18  06:20    


 


Total Protein  7.6 g/dL (5.8-8.3)   11/18/18  05:00    


 


Albumin  3.8 g/dL (3.0-4.8)   11/18/18  05:00    


 


Globulin  3.8 gm/dL  11/18/18  05:00    


 


Albumin/Globulin Ratio  1.0  (1.1-1.8)  L  11/18/18  05:00    


 


Triglycerides  151 mg/dL ()   11/16/18  06:20    


 


Cholesterol  178 mg/dL (130-200)   11/16/18  06:20    


 


LDL Cholesterol Direct  114 mg/dL (0-129)   11/16/18  06:20    


 


HDL Cholesterol  42 mg/dL (29-60)   11/16/18  06:20    


 


Vitamin B12  295 pg/mL (239-931)   11/16/18  07:30    


 


25-OH Vitamin D Total  < 12.8 NG/ML (30.0-100.0)  L  11/16/18  06:30    


 


Folate  7.8 ng/mL  11/16/18  07:30    


 


Procalcitonin  3.53 NG/ML (0.19-0.49)  H  11/16/18  00:17    


 


TSH 3rd Generation  0.51 mIU/mL (0.46-4.68)   11/15/18  19:15    


 


Venous Blood Potassium  3.9 mmol/L (3.6-5.2)   11/16/18  00:17    


 


Urine Color  Yellow  (YELLOW)   11/15/18  18:13    


 


Urine Appearance  Sl cloudy  (CLEAR)   11/15/18  18:13    


 


Urine pH  7.5  (4.7-8.0)   11/15/18  18:13    


 


Ur Specific Gravity  1.020  (1.005-1.035)   11/15/18  18:13    


 


Urine Protein  Trace mg/dL (<30 mg/dL)  H  11/15/18  18:13    


 


Urine Glucose (UA)  Negative mg/dL (NEGATIVE)   11/15/18  18:13    


 


Urine Ketones  Negative mg/dL (NEGATIVE)   11/15/18  18:13    


 


Urine Blood  Trace-intact  (NEGATIVE)  H  11/15/18  18:13    


 


Urine Nitrate  Negative  (NEGATIVE)   11/15/18  18:13    


 


Urine Bilirubin  Negative  (NEGATIVE)   11/15/18  18:13    


 


Urine Urobilinogen  0.2 E.U./dL (<1 E.U./dL)   11/15/18  18:13    


 


Ur Leukocyte Esterase  Negative Rolando/uL (NEGATIVE)   11/15/18  18:13    


 


Urine RBC  0 - 2 /hpf (0-2)   11/15/18  18:13    


 


Urine WBC  Negative /hpf (0-6)   11/15/18  18:13    


 


Urine Opiates Screen  Negative  (NEGATIVE)   11/15/18  18:13    


 


Urine Methadone Screen  Negative  (NEGATIVE)   11/15/18  18:13    


 


Ur Barbiturates Screen  Negative  (NEGATIVE)   11/15/18  18:13    


 


Ur Phencyclidine Scrn  Negative  (NEGATIVE)   11/15/18  18:13    


 


Ur Amphetamines Screen  Negative  (NEGATIVE)   11/15/18  18:13    


 


U Benzodiazepines Scrn  Negative  (NEGATIVE)   11/15/18  18:13    


 


U Oth Cocaine Metabols  Negative  (NEGATIVE)   11/15/18  18:13    


 


U Cannabinoids Screen  Negative  (NEGATIVE)   11/15/18  18:13    


 


HIV 1&2 Ag/Ab, 4th Gen  Nonreactive  (Nonreactive)   11/17/18  06:30    


 


Influenza Typ A,B (EIA)  Negative for flu a/b  (NEGATIVE)   11/16/18  18:39    


 


Ur L.pneumophila Ag  Negative  (NEGATIVE)   11/16/18  14:17    


 


Ur Strep pneumoniae Ag  Not detected  (Not Detected)   11/16/18  14:17    














- Hospital Course


Hospital Course: 





Upon Admission:


Pt is a 34 yo male, with a PMH of asthma, who presents to the ED complaining of 

SOB, bilateral upper extremity 5th digit numbness and sweating, and bilateral 

lower extremity cramping since waking up at 10am this morning. Pt reports this 

does not feel like his normal asthma attacks and he does not know if he has ever

had a panic attack in the past. Reports anorexia, as well as nausea but denies 

vomiting. Pt states he tried his albuterol inhaler, but his symptoms were not 

relieved. These symptoms have been present all day. He reports subjective fever,

sweating, heart racing, and mild chest pain on deep breathing, he describes this

pain as a tightness. Denies cough or expectoration. Admits to feeling anxious 

but denies ever having panic attacks or being clinically diagnosed with any 

psychiatric history. Patient denies any recent travel or sick contact. Patient 

denies any drug use. A 12 point ROS was obtained and added to the HPI where 

appropriate. 





Hospital Course: 


Pt was worked up for ACS r/o MI. His trops were trended and were .04, .04, .03 

respectively. Pt also had noted T wave inversions in leads V5-V6, lead II. 

Cardio was consulted and recommended cardiac cath, which the pt refused. The 

importance of the procedure was explained to the pt by multiple teams and the 

risk of delaying the procedure were also explained to the pt yet he continued to

refuse. Pt was also noted to meet 2/4 SIRS criteria and had an elevated pro-keke 

indicating that the pt had an underlying bacterial infection. Pts urine and CXR 

was clear, and then the pt noted a new found rash on his anterior leg after 

finishing a shower. Pt was started on unasyn and doxycycline. The pt was noted 

to be improving on lab work and clinically with the start of the abx. The pt 

then was transitioned to PO abx and was informed of the medical plan for d/c. 

The pt expressed understanding and agreement of the medical plan for d/c. The pt

was again asked to attempt to undergo cardiac cath which the pt again refused. 

All questions and concerns of the pt were addressed prior to d/c. Pt was given 

ASA, Lipitor and Lisinopril prior to d/c to attempt to medically optimize pts 

cardiac status.





Upon D/c:


The pt was explicity given the following instructions:


- For your cellulitis, Please complete the whole course of your antibiotics 

keflex, and doxycycline as prescribed


- Please take your newly prescribed medications aspirin, lisinopril and lipitor 

as directed


- Please follow up with the [facility] at , they can be reached at [phone]. They will call you later in the 

week to make an appointment. When you come to the clinic for your scheduled 

appointment, you need to follow up on the follow:


   1. This hospitalization stay


   2. Monitor blood pressure


   3. Since you start lisinopril, need to do blood work to recheck your kidney 

function.


   4. Since you start lipitor, need to monitor liver function


- Please begin an aerobic exercise regimen to help lower your cholesterol levels

and to help reduce your future risk of serious heart conditions. 


- If you have any new, worsening or return of any symptoms please return to the 

Emergency department.





Discharge Exam





- Head Exam


Head Exam: ATRAUMATIC, NORMAL INSPECTION, NORMOCEPHALIC





- Eye Exam


Eye Exam: EOMI, Normal appearance, PERRL





- Respiratory Exam


Respiratory Exam: NORMAL BREATHING PATTERN, UNREMARKABLE.  absent: Accessory 

Muscle Use, Decreased Breath Sounds, Rales, Rhonchi, Wheezes, Respiratory 

Distress, Stridor





- Cardiovascular Exam


Cardiovascular Exam: RRR, +S1, +S2.  absent: Gallop, Rubs





- GI/Abdominal Exam


GI & Abdominal Exam: Normal Bowel Sounds, Soft, Unremarkable.  absent: 

Tenderness





- Extremities Exam


Extremities exam: normal capillary refill, normal inspection, pedal pulses 

present





- Back Exam


Back exam: NORMAL INSPECTION.  absent: CVA tenderness (L), CVA tenderness (R)





- Neurological Exam


Neurological exam: Alert, Oriented x3





- Psychiatric Exam


Psychiatric exam: Normal Affect, Normal Mood





- Skin


Skin Exam: Dry, Intact, Normal Color, Warm





Discharge Plan





- Discharge Medications


Prescriptions: 


RX: Aspirin [Ecotrin] 81 mg PO DAILY 30 Days #30 tabec


Atorvastatin [Lipitor] 10 mg PO DIN 30 Days #30 tab


Cephalexin [cephalexin] 500 mg PO BID 7 Days #14 cap


RX: Doxycycline Hyclate [Doryx] 100 mg PO BID 7 Days #14 cap


RX: Lisinopril [Zestril] 10 mg PO DAILY 14 Days #14 tab





- Follow Up Plan


Condition: STABLE


Disposition: HOME/ ROUTINE


Instructions:  Cellulitis and Erysipelas (Skin Infections), Shortness of Breath 

(Dyspnea) (DC)


Additional Instructions: 


- For your cellulitis, Please complete the whole course of your antibiotics 

keflex, and doxycycline as prescribed


- Please take your newly prescribed medications aspirin, lisinopril and lipitor 

as directed


- Please follow up with the [facility] at , they can be reached at [phone]. They will call you later in the 

week to make an appointment. When you come to the clinic for your scheduled 

appointment, you need to follow up on the follow:


   1. This hospitalization stay


   2. Monitor blood pressure


   3. Since you start lisinopril, need to do blood work to recheck your kidney 

function.


   4. Since you start lipitor, need to monitor liver function


- Please begin an aerobic exercise regimen to help lower your cholesterol levels

 and to help reduce your future risk of serious heart conditions. 


- If you have any new, worsening or return of any symptoms please return to the 

Emergency department.





<Sivakumar Fuentes - Last Filed: 11/19/18 14:23>





Provider





- Provider


Date of Admission: 


11/16/18 10:44





Attending physician: 


Indiana Reynoso, DO








Hospital Course





- Lab Results


Lab Results: 


                                  Micro Results





11/16/18 00:17   Blood   Blood Culture - Preliminary


                            NO GROWTH AFTER 3 DAYS


11/16/18 14:17   Urine,Clean Catch   Urine Culture - Final


                            No Growth (<1,000 CFU/ML)





                             Most Recent Lab Values











WBC  6.0 10^3/uL (4.5-11.0)  D 11/18/18  05:00    


 


RBC  4.34 10^6/uL (3.5-6.1)   11/18/18  05:00    


 


Hgb  13.8 g/dL (14.0-18.0)  L  11/18/18  05:00    


 


Hct  41.2 % (42.0-52.0)  L  11/18/18  05:00    


 


MCV  94.9 fl (80.0-105.0)   11/18/18  05:00    


 


MCH  31.8 pg (25.0-35.0)   11/18/18  05:00    


 


MCHC  33.5 g/dl (31.0-37.0)   11/18/18  05:00    


 


RDW  13.1 % (11.5-14.5)   11/18/18  05:00    


 


Plt Count  203 10^3/uL (120.0-450.0)   11/18/18  05:00    


 


MPV  9.6 fl (7.0-11.0)   11/18/18  05:00    


 


Gran %  56.7 % (50.0-68.0)   11/18/18  05:00    


 


Lymph % (Auto)  32.2 % (22.0-35.0)   11/18/18  05:00    


 


Mono % (Auto)  7.4 % (1.0-6.0)  H  11/18/18  05:00    


 


Eos % (Auto)  3.5 % (1.5-5.0)   11/18/18  05:00    


 


Baso % (Auto)  0.2 % (0.0-3.0)   11/18/18  05:00    


 


Gran #  3.39  (1.4-6.5)   11/18/18  05:00    


 


Lymph # (Auto)  1.9  (1.2-3.4)   11/18/18  05:00    


 


Mono # (Auto)  0.4  (0.1-0.6)   11/18/18  05:00    


 


Eos # (Auto)  0.2  (0.0-0.7)   11/18/18  05:00    


 


Baso # (Auto)  0.01 K/mm3 (0.0-2.0)   11/18/18  05:00    


 


Neutrophils % (Manual)  94 % (50.0-70.0)  H  11/15/18  17:52    


 


Band Neutrophils %  1 % (0-2)   11/15/18  17:52    


 


Lymphocytes % (Manual)  1 % (22.0-35.0)  L  11/15/18  17:52    


 


Monocytes % (Manual)  4 % (1.0-6.0)   11/15/18  17:52    


 


Platelet Evaluation  Normal  (NORMAL)   11/15/18  17:52    


 


D-Dimer, Quantitative  < 200 ng/mlDDU (0-243)   11/15/18  19:29    


 


pCO2  31 mm/Hg (35-45)  L  11/15/18  17:00    


 


pO2  19 mm/Hg (30-55)  L  11/16/18  00:17    


 


HCO3  25.3 mmol/L (21-28)   11/15/18  17:00    


 


ABG pH  7.52  (7.35-7.45)  H  11/15/18  17:00    


 


ABG Total CO2  26.3 mmol.L (22-28)   11/15/18  17:00    


 


ABG O2 Saturation  99.4 % (95-98)  H  11/15/18  17:00    


 


ABG O2 Content  20.2 ML/dl (15-23)   11/15/18  17:00    


 


ABG Base Excess  3.2 mmol/L (-2.0-3.0)  H  11/15/18  17:00    


 


ABG Hemoglobin  14.9 g/dL (11.7-17.4)   11/15/18  17:00    


 


ABG Carboxyhemoglobin  2.2 % (0.5-1.5)  H  11/15/18  17:00    


 


POC ABG HHb (Measured)  0.6 % (0-5)   11/15/18  17:00    


 


ABG Methemoglobin  1.1 % (0.0-3.0)   11/15/18  17:00    


 


ABG O2 Capacity  20.3 mL/dl (16-24)   11/15/18  17:00    


 


VBG pH  7.43  (7.32-7.43)   11/16/18  00:17    


 


VBG pCO2  52.0  (40-60)   11/16/18  00:17    


 


VBG HCO3  34.5 mmol/l (21-28)  H  11/16/18  00:17    


 


VBG Total CO2  36.1 mmol.L (22-28)  H  11/16/18  00:17    


 


VBG O2 Sat (Calc)  32.8 % (40-65)  L  11/16/18  00:17    


 


VBG Base Excess  8.5 mmol/L (0.0-2.0)  H  11/16/18  00:17    


 


VBG Potassium  3.9 mmol/L (3.6-5.2)   11/16/18  00:17    


 


Hgb O2 Saturation  96.1 % (95.0-98.0)   11/15/18  17:00    


 


Sodium  137.0 mmol/L (132-148)   11/16/18  00:17    


 


Chloride  97.0 mmol/L ()  L  11/16/18  00:17    


 


Glucose  123 mg/dl ()  H  11/16/18  00:17    


 


Lactate  1.6 mmol/L (0.7-2.1)   11/16/18  00:17    


 


FiO2  21.0 %  11/16/18  00:17    


 


Sodium  140 mmol/L (132-148)   11/18/18  05:00    


 


Potassium  4.5 mmol/L (3.6-5.0)   11/18/18  05:00    


 


Chloride  103 mmol/L ()   11/18/18  05:00    


 


Carbon Dioxide  28 mmol/L (21-33)   11/18/18  05:00    


 


Anion Gap  13  (10-20)   11/18/18  05:00    


 


BUN  14 mg/dL (7-21)   11/18/18  05:00    


 


Creatinine  1.0 mg/dl (0.8-1.5)   11/18/18  05:00    


 


Est GFR ( Amer)  > 60   11/18/18  05:00    


 


Est GFR (Non-Af Amer)  > 60   11/18/18  05:00    


 


Random Glucose  109 mg/dL ()   11/18/18  05:00    


 


Hemoglobin A1c  5.1 % (4.2-6.5)   11/16/18  06:20    


 


Calcium  9.1 mg/dL (8.4-10.5)   11/18/18  05:00    


 


Phosphorus  3.8 mg/dL (2.5-4.5)   11/18/18  05:00    


 


Magnesium  2.1 mg/dL (1.7-2.2)   11/17/18  06:30    


 


Total Bilirubin  0.7 mg/dL (0.2-1.3)   11/18/18  05:00    


 


AST  31 U/L (17-59)   11/18/18  05:00    


 


ALT  42 U/L (7-56)   11/18/18  05:00    


 


Alkaline Phosphatase  60 U/L ()   11/18/18  05:00    


 


Total Creatine Kinase  63 U/L ()   11/15/18  18:51    


 


Troponin I  0.03 ng/mL D 11/16/18  06:20    


 


Total Protein  7.6 g/dL (5.8-8.3)   11/18/18  05:00    


 


Albumin  3.8 g/dL (3.0-4.8)   11/18/18  05:00    


 


Globulin  3.8 gm/dL  11/18/18  05:00    


 


Albumin/Globulin Ratio  1.0  (1.1-1.8)  L  11/18/18  05:00    


 


Triglycerides  151 mg/dL ()   11/16/18  06:20    


 


Cholesterol  178 mg/dL (130-200)   11/16/18  06:20    


 


LDL Cholesterol Direct  114 mg/dL (0-129)   11/16/18  06:20    


 


HDL Cholesterol  42 mg/dL (29-60)   11/16/18  06:20    


 


Vitamin B12  295 pg/mL (239-931)   11/16/18  07:30    


 


25-OH Vitamin D Total  < 12.8 NG/ML (30.0-100.0)  L  11/16/18  06:30    


 


Folate  7.8 ng/mL  11/16/18  07:30    


 


Procalcitonin  3.53 NG/ML (0.19-0.49)  H  11/16/18  00:17    


 


TSH 3rd Generation  0.51 mIU/mL (0.46-4.68)   11/15/18  19:15    


 


Venous Blood Potassium  3.9 mmol/L (3.6-5.2)   11/16/18  00:17    


 


Urine Color  Yellow  (YELLOW)   11/15/18  18:13    


 


Urine Appearance  Sl cloudy  (CLEAR)   11/15/18  18:13    


 


Urine pH  7.5  (4.7-8.0)   11/15/18  18:13    


 


Ur Specific Gravity  1.020  (1.005-1.035)   11/15/18  18:13    


 


Urine Protein  Trace mg/dL (<30 mg/dL)  H  11/15/18  18:13    


 


Urine Glucose (UA)  Negative mg/dL (NEGATIVE)   11/15/18  18:13    


 


Urine Ketones  Negative mg/dL (NEGATIVE)   11/15/18  18:13    


 


Urine Blood  Trace-intact  (NEGATIVE)  H  11/15/18  18:13    


 


Urine Nitrate  Negative  (NEGATIVE)   11/15/18  18:13    


 


Urine Bilirubin  Negative  (NEGATIVE)   11/15/18  18:13    


 


Urine Urobilinogen  0.2 E.U./dL (<1 E.U./dL)   11/15/18  18:13    


 


Ur Leukocyte Esterase  Negative Rolando/uL (NEGATIVE)   11/15/18  18:13    


 


Urine RBC  0 - 2 /hpf (0-2)   11/15/18  18:13    


 


Urine WBC  Negative /hpf (0-6)   11/15/18  18:13    


 


Urine Opiates Screen  Negative  (NEGATIVE)   11/15/18  18:13    


 


Urine Methadone Screen  Negative  (NEGATIVE)   11/15/18  18:13    


 


Ur Barbiturates Screen  Negative  (NEGATIVE)   11/15/18  18:13    


 


Ur Phencyclidine Scrn  Negative  (NEGATIVE)   11/15/18  18:13    


 


Ur Amphetamines Screen  Negative  (NEGATIVE)   11/15/18  18:13    


 


U Benzodiazepines Scrn  Negative  (NEGATIVE)   11/15/18  18:13    


 


U Oth Cocaine Metabols  Negative  (NEGATIVE)   11/15/18  18:13    


 


U Cannabinoids Screen  Negative  (NEGATIVE)   11/15/18  18:13    


 


HIV 1&2 Ag/Ab, 4th Gen  Nonreactive  (Nonreactive)   11/17/18  06:30    


 


Influenza Typ A,B (EIA)  Negative for flu a/b  (NEGATIVE)   11/16/18  18:39    


 


Ur L.pneumophila Ag  Negative  (NEGATIVE)   11/16/18  14:17    


 


Ur Strep pneumoniae Ag  Not detected  (Not Detected)   11/16/18  14:17    














Attending/Attestation





- Attestation


I have personally seen and examined this patient.: Yes


I have fully participated in the care of the patient.: Yes


I have reviewed all pertinent clinical information, including history, physical 

exam and plan: Yes


Notes (Text): 





11/19/18 14:19


Medical record note made by the resident after discussion with my direction and 

input after the patient was personally seen and examined by me. I have reviewed 

the chart and agree that the record accurately reflects by personal performance 

of the history, physical exam, data review, and medical decision-making, in the 

course for the patient. I have also personally directed the plan of care.





33 year old male with PMH  of Morbid Obesity, asthma, presents for sob, chest 

tightness, bilateral upper extermity numbness in 5th digit, and bilateral lower 

thigh weakness/numbness.


Headache and Numbness has resolved.Troponins were boderline, Patient is pain 

free, Echo showed LVH, refusing any further cardiology work up as recommended by

 cardiology.Patient has been started on lisinopril as blood pressure was running

 high and he also LVH.





Patient was found to be febrile  due to right lower leg cellulitis,  has 

elevated Procalcitonin , he was started on Unasyn and Vancomycin and 

doxycycline.He has responded well.His redness and swelling of leg is decreased.





He will be discharged home on oral keflex and doxycycline.








Management plan was discussed in detail with patient. Education was provided.

## 2020-07-23 ENCOUNTER — TRANSCRIPTION ENCOUNTER (OUTPATIENT)
Age: 35
End: 2020-07-23

## 2020-08-09 ENCOUNTER — TRANSCRIPTION ENCOUNTER (OUTPATIENT)
Age: 35
End: 2020-08-09

## 2021-04-27 ENCOUNTER — EMERGENCY (EMERGENCY)
Facility: HOSPITAL | Age: 36
LOS: 1 days | Discharge: HOME | End: 2021-04-27
Attending: STUDENT IN AN ORGANIZED HEALTH CARE EDUCATION/TRAINING PROGRAM
Payer: COMMERCIAL

## 2021-04-27 VITALS
DIASTOLIC BLOOD PRESSURE: 99 MMHG | HEART RATE: 82 BPM | OXYGEN SATURATION: 99 % | RESPIRATION RATE: 18 BRPM | TEMPERATURE: 98 F | SYSTOLIC BLOOD PRESSURE: 170 MMHG

## 2021-04-27 PROCEDURE — 73610 X-RAY EXAM OF ANKLE: CPT

## 2021-04-27 PROCEDURE — 99283 EMERGENCY DEPT VISIT LOW MDM: CPT

## 2021-04-27 PROCEDURE — 99283 EMERGENCY DEPT VISIT LOW MDM: CPT | Mod: 25

## 2021-04-27 PROCEDURE — 73610 X-RAY EXAM OF ANKLE: CPT | Mod: 26,RT

## 2021-04-27 RX ORDER — IBUPROFEN 200 MG
600 TABLET ORAL ONCE
Refills: 0 | Status: COMPLETED | OUTPATIENT
Start: 2021-04-27 | End: 2021-04-27

## 2021-04-27 RX ADMIN — Medication 600 MILLIGRAM(S): at 13:15

## 2021-04-27 RX ADMIN — Medication 600 MILLIGRAM(S): at 12:15

## 2021-04-27 NOTE — ED PROVIDER NOTE - ATTENDING CONTRIBUTION TO CARE
I performed the initial face to face bedside interview with this patient regarding history of present illness, review of symptoms and past medical, social and family history.  I completed an independent physical examination.  I was the initial provider who evaluated this patient.  The history, review of symptoms and examination was documented by the scribe in my presence and I attest to the accuracy of the documentation.  I have signed out the follow up of any pending tests (i.e. labs, radiological studies) to the PA/NP.  I have discussed the patient’s plan of care and disposition with the PA/NP.   well appearing male, no acute distress, normal work of breathing. no emergent findings on xray. will discharge with ortho follow-up.

## 2021-04-27 NOTE — ED PROVIDER NOTE - PATIENT PORTAL LINK FT
You can access the FollowMyHealth Patient Portal offered by [facility] by registering at the following website: [email or web address]. By joining [facility]’s FollowMyHealth portal, you will also be able to view your health information using other applications (apps) compatible with our system.

## 2021-04-27 NOTE — ED PROVIDER NOTE - NSFOLLOWUPINSTRUCTIONS_ED_ALL_ED_FT
Follow up with the podiatry clinic within 1 week.  If you experience any new or worsening symptoms or if you are concerned you can always come back to the emergency for a re-evaluation.  For pain you can take over the counter Ibuprofen 600 mg orally every 6 hours as needed for pain. Take medication with food.

## 2021-04-27 NOTE — ED PROVIDER NOTE - CHPI ED SYMPTOMS NEG
no recent injuries, shooting pain to toes, focal weakness, calf swelling or pain/no numbness/no tingling

## 2021-04-27 NOTE — ED PROVIDER NOTE - OBJECTIVE STATEMENT
36 year old male with no significant PMHx or PSHx presents to the ED with complaints of intermittent right ankle pain. Patient reports that usually when the pain occurs it resolves on its own, however states that over the last three days he has woken up with right inner ankle pain and swelling which causes him to have difficulty bearing weight. Patient states that he still worked yesterday despite his symptoms, and is now noting worsening of the pain today. Patient otherwise denies any recent injuries, shooting pain to the toes, numbness, tingling, focal weakness, calf swelling or pain, and all other acute complaints. NKDA. 36 year old male history of nrecently diagnosd with HTN, presents to the ED with complaints of intermittent right ankle pain. Patient reports that usually when the pain occurs it resolves on its own, however states that over the last three days he has woken up with right inner ankle pain and swelling which causes him to have difficulty bearing weight. Patient states that he still worked yesterday despite his symptoms, and is now noting worsening of the pain today. Patient otherwise denies any recent injuries, shooting pain to the toes, numbness, tingling, focal weakness, calf swelling or pain, and all other acute complaints. NKDA.

## 2021-04-27 NOTE — ED ADULT TRIAGE NOTE - RESPIRATORY RATE (BREATHS/MIN)
Verified Results  MA FFDM SCREEN W CAD DUSTIN 15Mar2017 11:07AM ALBINA RANGEL   Ordering Provider: ALBINA COTE.    Reason For Study: SCREEN,SCREEN.   [Mar 16, 2017 10:48AM KORIN ALFORD]  benign   [Mar 16, 2017 8:50AM KORIN ALFORD]  benign     Test Name Result Flag Reference   MA FFDM SCREEN W CAD DUSTIN (Report)     Accession #    [id number]    #[id number] - MA FFDM SCREEN W CAD DUSTIN  BILATERAL DIGITAL SCREENING MAMMOGRAM WITH CAD WITH MEDIOLATERAL OBLIQUE CRANIOCAUDAL: 3/15/2017    CLINICAL HISTORY:Routine Screening.    COMPARISON:   Comparison is made to exams dated: 2/11/2016 mammogram, 1/7/2015 mammogram, 12/12/2013   mammogram, and 9/1/2012 mammogram - [facility].    FINDINGS:  There are scattered fibroglandular elements in both breasts.  There are benign vascular calcifications in both breasts. There also are benign scattered   calcifications in both breasts.    No significant masses, calcifications, or other findings are seen in either breast.  Current study was also evaluated with a Computer Aided Detection (CAD) system.  There has been no significant interval change.    IMPRESSION: BENIGN    There is no mammographic evidence of malignancy. A 1 year screening mammogram is recommended.    MAMMOGRAPHY BI-RADS: 2 BENIGN    Tremaine Cutler M.D.  ag/debra:3/16/2017 07:45:57  copy to: DO KORIN ALFORD, ph: [phone]    Imaging Technologist: Archana Carrasco RT(R)(M), [facility]  letter sent: Normal Single Exam  [id number]     **** FINAL ****    Dictated By:   TREMAINE SINGH MD    Electronically Reviewed and Approved By:  TREMAINE SINGH MD       
18

## 2021-04-27 NOTE — ED PROVIDER NOTE - NSFOLLOWUPCLINICS_GEN_ALL_ED_FT
Home
[facility]  [facility]iatry/[facility]ound [facility]  [address] [place] [address]  Phone: [phone]  Fax: [phone]

## 2021-04-27 NOTE — ED PROVIDER NOTE - CLINICAL SUMMARY MEDICAL DECISION MAKING FREE TEXT BOX
36 year old male presents to the ED with complaints of nontraumatic right ankle pain x3 days. Neurovascularly intact. No signs of infection. Low suspicion for DVT. Will obtain x-rays, give Ibuprofen for pain, and reassess.

## 2021-04-27 NOTE — ED ADULT NURSE NOTE - NSIMPLEMENTINTERV_GEN_ALL_ED
Implemented All Fall Risk Interventions:  [place] to call system. Call bell, personal items and telephone within reach. Instruct patient to call for assistance. Room bathroom lighting operational. Non-slip footwear when patient is off stretcher. Physically safe environment: no spills, clutter or unnecessary equipment. Stretcher in lowest position, wheels locked, appropriate side rails in place. Provide visual cue, wrist band, yellow gown, etc. Monitor gait and stability. Monitor for mental status changes and reorient to person, place, and time. Review medications for side effects contributing to fall risk. Reinforce activity limits and safety measures with patient and family.

## 2021-04-27 NOTE — ED PROVIDER NOTE - DISPOSITION TYPE
Patient called in for lab results. Aware that all are negative. Diarrhea has eased up somewhat, and feeling better.   
DISCHARGE

## 2021-04-27 NOTE — ED PROVIDER NOTE - PHYSICAL EXAMINATION
Right foot without any swelling or tenderness  DP and PT pulses intact 2+  Right ankle with slightly limited range of motion with medial malleolar swelling and tenderness  No erythema, streaking, or induration  Calf compartments soft and nontender

## 2021-04-27 NOTE — ED PROVIDER NOTE - PROGRESS NOTE DETAILS
XR shows no fracture, +soft tissue swelling to medial mall. ACE wrap and aircast applied for comfort, crutches, IBU, podiatry follow up within 1 week. Pt is well appearing walking with steady gait, stable for discharge and follow up without fail with medical doctor. I had a detailed discussion with the patient and/or guardian regarding the historical points, exam findings, and any diagnostic results supporting the discharge diagnosis. Pt educated on care and need for follow up. Strict return instructions and red flag signs and symptoms discussed with patient. Questions answered. Pt shows understanding of discharge information and agrees to follow. Patient has a high blood pressure reading. Didn't take his Lisinopril/HCT 20-12.5 mg today. Will take in the ER. Reports that will follow up with PMD within 1-2 days.

## 2021-04-28 ENCOUNTER — OUTPATIENT (OUTPATIENT)
Dept: OUTPATIENT SERVICES | Facility: HOSPITAL | Age: 36
LOS: 1 days | End: 2021-04-28
Payer: COMMERCIAL

## 2021-04-28 ENCOUNTER — APPOINTMENT (OUTPATIENT)
Dept: PODIATRY | Facility: CLINIC | Age: 36
End: 2021-04-28

## 2021-04-28 VITALS
HEIGHT: 66 IN | WEIGHT: 285 LBS | SYSTOLIC BLOOD PRESSURE: 184 MMHG | OXYGEN SATURATION: 98 % | DIASTOLIC BLOOD PRESSURE: 117 MMHG | TEMPERATURE: 96.5 F | BODY MASS INDEX: 45.8 KG/M2 | HEART RATE: 94 BPM | RESPIRATION RATE: 18 BRPM

## 2021-04-28 DIAGNOSIS — M76.821 POSTERIOR TIBIAL TENDINITIS, RIGHT LEG: ICD-10-CM

## 2021-04-28 DIAGNOSIS — Z00.00 ENCOUNTER FOR GENERAL ADULT MEDICAL EXAMINATION WITHOUT ABNORMAL FINDINGS: ICD-10-CM

## 2021-04-28 DIAGNOSIS — M25.571 PAIN IN RIGHT ANKLE AND JOINTS OF RIGHT FOOT: ICD-10-CM

## 2021-04-28 DIAGNOSIS — Z86.79 PERSONAL HISTORY OF OTHER DISEASES OF THE CIRCULATORY SYSTEM: ICD-10-CM

## 2021-04-28 DIAGNOSIS — S93.401A SPRAIN OF UNSPECIFIED LIGAMENT OF RIGHT ANKLE, INITIAL ENCOUNTER: ICD-10-CM

## 2021-04-28 PROBLEM — Z78.9 OTHER SPECIFIED HEALTH STATUS: Chronic | Status: ACTIVE | Noted: 2021-04-27

## 2021-04-28 PROCEDURE — G0463: CPT

## 2021-04-28 PROCEDURE — 36415 COLL VENOUS BLD VENIPUNCTURE: CPT

## 2021-04-28 PROCEDURE — 84550 ASSAY OF BLOOD/URIC ACID: CPT

## 2021-04-28 NOTE — ASSESSMENT
[FreeTextEntry1] : O; \par Vasc: DP/PT 2/4 b/l, TG warm to cool , CFT<3x10, no erythema, mild ankle edema right \par Derm: no open wounds or clinical signs of infection, no ecchymosis or signs of hematoma \par Neuro: protective sensation intact via [place] touch test \par M/S: pain on right passive and active ankle DF and PF, no pain on palpation to ATFL or CFT, mild pain with palpation of deltoid and PTT .\par \par A; \par Ankle sprain \par Possible PTT injury  right\par r/o gout\par \par P; \par Pt evaluated and chart reviewed \par Hospital records reviewed\par Diagnosis and treatment discussed with pt \par Xrays reviewed, no fracture or diastasis \par Rx MRI to evaluate PTT and ankle joint\par Consent obtained for right ankle injection \par 2CC total 1cc 1% Lidocaine 1cc dexamethasone (4mg) to right anterior ankle \par Pt tolerated well no complications \par Continue aircast and crutches, NWB  \par Continue Motrin for pain \par Uric acid labs ordered \par RTC after MRI

## 2021-04-28 NOTE — HISTORY OF PRESENT ILLNESS
[FreeTextEntry1] : 36M PTC for initial visit for right ankle pain. Pt states he was in the [facility] ED yesterday for the ankle pain; he had an xray and was given [place] and an aircast. Pt states he has pain when he walks and has been using crutches since yesterday. Pt denies injury, denies hx of gout. Does relate intake of steak and alcohol the night prior. Pt states he walks and squats a lot for work as a . Pt is overweight. Denies other pedal complaints. Hx of hypertension. Denies allergies  normal

## 2021-04-29 LAB — URATE SERPL-MCNC: 9 MG/DL

## 2021-05-01 ENCOUNTER — APPOINTMENT (OUTPATIENT)
Dept: MRI IMAGING | Facility: HOSPITAL | Age: 36
End: 2021-05-01
Payer: COMMERCIAL

## 2021-05-01 ENCOUNTER — OUTPATIENT (OUTPATIENT)
Dept: OUTPATIENT SERVICES | Facility: HOSPITAL | Age: 36
LOS: 1 days | End: 2021-05-01
Payer: COMMERCIAL

## 2021-05-01 DIAGNOSIS — M25.571 PAIN IN RIGHT ANKLE AND JOINTS OF RIGHT FOOT: ICD-10-CM

## 2021-05-01 PROCEDURE — 73721 MRI JNT OF LWR EXTRE W/O DYE: CPT

## 2021-05-01 PROCEDURE — 73721 MRI JNT OF LWR EXTRE W/O DYE: CPT | Mod: 26,RT

## 2021-05-04 ENCOUNTER — APPOINTMENT (OUTPATIENT)
Dept: PODIATRY | Facility: CLINIC | Age: 36
End: 2021-05-04

## 2021-05-08 NOTE — RAD
Date of service: 11/18/2018



PROCEDURE:  Cervical Spine Radiographs.



HISTORY:

Pain. 



COMPARISON:

None available. 



FINDINGS:



BONES:

The dens tip is obscured.  C7 vertebral body is not demonstrated on 

lateral view.  No acute displaced fracture identified.  Straightening 

of the normal cervical lordosis may be related to muscle spasm or 

positioning. 



DISC SPACES:

Unremarkable. 



SOFT TISSUES:

Unremarkable.  No prevertebral soft tissue swelling. 



OTHER FINDINGS:

None.



IMPRESSION:

The dens tip is obscured.  C7 vertebral body is not demonstrated on 

lateral view.  No acute displaced fracture identified.  

Cross-sectional imaging may be considered for further evaluation if 

indicated. 



Straightening of the normal cervical lordosis may be related to 

muscle spasm or positioning. 08-May-2021 14:08

## 2022-09-07 ENCOUNTER — NON-APPOINTMENT (OUTPATIENT)
Age: 37
End: 2022-09-07

## 2024-04-21 ENCOUNTER — NON-APPOINTMENT (OUTPATIENT)
Age: 39
End: 2024-04-21